# Patient Record
Sex: FEMALE | Race: WHITE | NOT HISPANIC OR LATINO | Employment: FULL TIME | URBAN - METROPOLITAN AREA
[De-identification: names, ages, dates, MRNs, and addresses within clinical notes are randomized per-mention and may not be internally consistent; named-entity substitution may affect disease eponyms.]

---

## 2017-03-15 NOTE — PATIENT INSTRUCTIONS
Preventive Health Recommendations  Female Ages 50 - 64    Yearly exam: See your health care provider every year in order to  o Review health changes.   o Discuss preventive care.    o Review your medicines if your doctor has prescribed any.      Get a Pap test every three years (unless you have an abnormal result and your provider advises testing more often).    If you get Pap tests with HPV test, you only need to test every 5 years, unless you have an abnormal result.     You do not need a Pap test if your uterus was removed (hysterectomy) and you have not had cancer.    You should be tested each year for STDs (sexually transmitted diseases) if you're at risk.     Have a mammogram every 1 to 2 years.    Have a colonoscopy at age 50, or have a yearly FIT test (stool test). These exams screen for colon cancer.      Have a cholesterol test every 5 years, or more often if advised.    Have a diabetes test (fasting glucose) every three years. If you are at risk for diabetes, you should have this test more often.     If you are at risk for osteoporosis (brittle bone disease), think about having a bone density scan (DEXA).    Shots: Get a flu shot each year. Get a tetanus shot every 10 years.    Nutrition:     Eat at least 5 servings of fruits and vegetables each day.    Eat whole-grain bread, whole-wheat pasta and brown rice instead of white grains and rice.    Talk to your provider about Calcium and Vitamin D.     Lifestyle    Exercise at least 150 minutes a week (30 minutes a day, 5 days a week). This will help you control your weight and prevent disease.    Limit alcohol to one drink per day.    No smoking.     Wear sunscreen to prevent skin cancer.     See your dentist every six months for an exam and cleaning.    See your eye doctor every 1 to 2 years.    [facility]    If you have any questions regarding to your visit please contact your care team:       Team Purple:   Clinic Hours Telephone Number    [address] Dr[address]   7am-7pm  Monday - Thursday   7am-5pm  Fridays  [phone]  (Appointment scheduling available 24/7)    Questions about your Visit?   Team Line:  [phone]   [facility] [place] [facility] 11[facility]-9pm Monday-Friday Saturday-Sunday- 9am-5pm   [place] - 5pm-9pm Monday-Friday Saturday-Sunday- 9am-5pm  [phone] - [place]   [phone] - [place]       What options do I have for visits at the clinic other than the traditional office visit?  To expand how we care for you, many of our providers are utilizing electronic visits (e-visits) and telephone visits, when medically appropriate, for interactions with their patients rather than a visit in the clinic.   We also offer nurse visits for many medical concerns. Just like any other service, we will bill your insurance company for this type of visit based on time spent on the phone with your provider. Not all insurance companies cover these visits. Please check with your medical insurance if this type of visit is covered. You will be responsible for any charges that are not paid by your insurance.      E-visits via Coquelux:  generally incur a $35.00 fee.  Telephone visits:  Time spent on the phone: *charged based on time that is spent on the phone in increments of 10 minutes. Estimated cost:   5-10 mins $30.00   11-20 mins. $59.00   21-30 mins. $85.00     Use Coquelux (secure email communication and access to your chart) to send your primary care provider a message or make an appointment. Ask someone on your Team how to sign up for Coquelux.  For a Price Quote for your services, please call our Consumer Price Line at [phone].  As always, Thank you for trusting us with your health care needs!

## 2017-03-15 NOTE — PROGRESS NOTES
SUBJECTIVE:                                                    Livia Gill is a 61 year old female who presents to clinic today for the following health issues:        Diabetes Follow-up    Patient is checking blood sugars: once daily.  Results are as follows:         am - 140-145    Diabetic concerns: None     Symptoms of hypoglycemia (low blood sugar): none     Paresthesias (numbness or burning in feet) or sores: No     Date of last diabetic eye exam: 9 months     Hyperlipidemia Follow-Up      Rate your low fat/cholesterol diet?: not monitoring fat    Taking statin?  Yes, no muscle aches from statin    Other lipid medications/supplements?:  none     Hypertension Follow-up      Outpatient blood pressures are not being checked.    Low Salt Diet: not monitoring salt         Amount of exercise or physical activity: None    Problems taking medications regularly: No    Medication side effects: none    Diet: regular (no restrictions)           Problem list and histories reviewed & adjusted, as indicated.  Additional history: as documented    Patient Active Problem List   Diagnosis     Hyperlipidemia LDL goal <160     Cervical cancer screening     Type 2 diabetes mellitus without complication, without long-term current use of insulin (H)     Past Surgical History:   Procedure Laterality Date     C NONSPECIFIC PROCEDURE  1989    laparscopy for infertility     CATARACT IOL, RT/LT Right 1/2015    right cataract      COLONOSCOPY  00, 06, 12    polyps, every 5 years     D & C  1989     HC FEMUR/KNEE SURG UNLISTED      Lt. knee dislocation     TONSILLECTOMY & ADENOIDECTOMY      age 14       Social History   Substance Use Topics     Smoking status: Never Smoker     Smokeless tobacco: Never Used     Alcohol use No     Family History   Problem Relation Age of Onset     DIABETES Father      Other Cancer Sister      Ovarian Cancer         Current Outpatient Prescriptions   Medication Sig Dispense Refill     order for DME  Equipment being ordered: bilateral wrist splints with thumbs 2 each 0     lisinopril (PRINIVIL/ZESTRIL) 10 MG tablet Take 1 tablet (10 mg) by mouth daily 90 tablet 3     aspirin 81 MG tablet Take 1 tablet (81 mg) by mouth daily 30 tablet      blood glucose monitoring (ACCU-CHEK SMARTVIEW) test strip Use to test blood sugar 2 times daily for 2 weeks and then decrease down to 1 time per day.  Ok to substitute alternative if insurance prefers. 50 each 3     blood glucose monitoring (ACCU-CHEK FASTCLIX) lancets Use to test blood sugar 1 times daily or as directed.  Ok to substitute alternative if insurance prefers. 1 Box 1     MAGNESIUM OXIDE PO Take 250 mg by mouth daily       metFORMIN (GLUCOPHAGE) 500 MG tablet Take 1 tablet (500 mg) by mouth 2 times daily (with meals) 180 tablet 4     rosuvastatin (CRESTOR) 40 MG tablet Take 1 tablet (40 mg) by mouth daily 90 tablet 4     IBUPROFEN PO        MULTIVITAMIN TABS   OR 1 TABLET DAILY       No Known Allergies  Recent Labs   Lab Test  03/22/17   1557  10/27/16   1457  10/19/16   0746  09/16/13   0958  07/25/12   0919  12/07/10   0940  06/29/09   1008   A1C  6.2*  7.0*   --    --    --    --   6.0   LDL   --    --   35  50  59  61  64   HDL   --    --   38*  39*  43*  33*  42*   TRIG   --    --   215*  219*  214*  253*  148   ALT   --    --    --    --   35  48  81*   CR   --    --    --    --   0.87  1.00   --    GFRESTIMATED   --    --    --    --   67  57*   --    GFRESTBLACK   --    --    --    --   81  69   --    POTASSIUM   --    --    --    --   4.3   --    --    TSH   --    --    --    --   2.66   --   3.09      BP Readings from Last 3 Encounters:   03/22/17 128/68   12/20/16 132/74   12/12/16 129/77    Wt Readings from Last 3 Encounters:   03/22/17 200 lb (90.7 kg)   12/20/16 204 lb (92.5 kg)   12/12/16 205 lb (93 kg)                  Labs reviewed in EPIC    Reviewed and updated as needed this visit by clinical staff  Tobacco  Allergies  Meds  Med Hx  Surg Hx  " Fam Hx  Soc Hx      Reviewed and updated as needed this visit by Provider         ROS:  This 61 year old female is here today for diabetes check. She was diagnosed 6 months ago and has taken it seriously. She has lost 13 pounds and is feeling healthier. She has developed pain and tingling into her hands with occasional numbness in her fingers while she sleeps. She wonders if that is related to diabetes. All other review of systems are negative  Personal, family, and social history reviewed with patient and revised.    C: NEGATIVE for fever, chills, change in weight  E/M: NEGATIVE for ear, mouth and throat problems  R: NEGATIVE for significant cough or SOB  CV: NEGATIVE for chest pain, palpitations or peripheral edema    OBJECTIVE:                                                    /68 (BP Location: Right arm, Patient Position: Chair, Cuff Size: Adult Large)  Pulse 93  Temp 96.8  F (36  C)  Ht 5' 4.5\" (1.638 m)  Wt 200 lb (90.7 kg)  SpO2 99%  BMI 33.8 kg/m2  Body mass index is 33.8 kg/(m^2).  GENERAL: healthy, alert and no distress  NECK: no adenopathy, no asymmetry, masses, or scars and thyroid normal to palpation  RESP: lungs clear to auscultation - no rales, rhonchi or wheezes  CV: regular rate and rhythm, normal S1 S2, no S3 or S4, no murmur, click or rub, no peripheral edema and peripheral pulses strong  ABDOMEN: truncal obesity   MS: no gross musculoskeletal defects noted, no edema  Diabetic foot exam: normal DP and PT pulses, no trophic changes or ulcerative lesions, normal sensory exam and normal monofilament exam  Patient has bilateral positive tinnel's sign   Well hydrated  Well nourished  Well groomed  Alert and oriented X 3  Good spirits  Brisk gait with no shortness of breath     Diagnostic Test Results:  Results for orders placed or performed in visit on 03/22/17   Hemoglobin A1c   Result Value Ref Range    Hemoglobin A1C 6.2 (H) 4.3 - 6.0 %         ASSESSMENT/PLAN:                        "                                      1. Type 2 diabetes mellitus without complication, without long-term current use of insulin (H)  Good control   - Albumin Random Urine Quantitative  - TSH WITH FREE T4 REFLEX  - Hemoglobin A1c  - Basic metabolic panel  - C FOOT EXAM  NO CHARGE    2. Bilateral carpal tunnel syndrome  As above   - order for DME; Equipment being ordered: bilateral wrist splints with thumbs  Dispense: 2 each; Refill: 0    Return to clinic 3 months     AZIZA SHIELDS MD  [facility]

## 2017-03-22 ENCOUNTER — OFFICE VISIT (OUTPATIENT)
Dept: FAMILY MEDICINE | Facility: CLINIC | Age: 62
End: 2017-03-22
Payer: COMMERCIAL

## 2017-03-22 VITALS
SYSTOLIC BLOOD PRESSURE: 128 MMHG | WEIGHT: 200 LBS | BODY MASS INDEX: 33.32 KG/M2 | TEMPERATURE: 96.8 F | DIASTOLIC BLOOD PRESSURE: 68 MMHG | OXYGEN SATURATION: 99 % | HEART RATE: 93 BPM | HEIGHT: 65 IN

## 2017-03-22 DIAGNOSIS — E11.9 TYPE 2 DIABETES MELLITUS WITHOUT COMPLICATION, WITHOUT LONG-TERM CURRENT USE OF INSULIN (H): Primary | ICD-10-CM

## 2017-03-22 DIAGNOSIS — G56.03 BILATERAL CARPAL TUNNEL SYNDROME: ICD-10-CM

## 2017-03-22 LAB — HBA1C MFR BLD: 6.2 % (ref 4.3–6)

## 2017-03-22 PROCEDURE — 99214 OFFICE O/P EST MOD 30 MIN: CPT | Performed by: FAMILY MEDICINE

## 2017-03-22 PROCEDURE — 82043 UR ALBUMIN QUANTITATIVE: CPT | Performed by: FAMILY MEDICINE

## 2017-03-22 PROCEDURE — 83036 HEMOGLOBIN GLYCOSYLATED A1C: CPT | Performed by: FAMILY MEDICINE

## 2017-03-22 PROCEDURE — 80048 BASIC METABOLIC PNL TOTAL CA: CPT | Performed by: FAMILY MEDICINE

## 2017-03-22 PROCEDURE — 84443 ASSAY THYROID STIM HORMONE: CPT | Performed by: FAMILY MEDICINE

## 2017-03-22 PROCEDURE — 36415 COLL VENOUS BLD VENIPUNCTURE: CPT | Performed by: FAMILY MEDICINE

## 2017-03-22 PROCEDURE — 99207 C FOOT EXAM  NO CHARGE: CPT | Performed by: FAMILY MEDICINE

## 2017-03-22 NOTE — MR AVS SNAPSHOT
After Visit Summary   3/22/2017    Livia Gill    MRN: [id number]           Patient Information     Date Of Birth          1955        Visit Information        Provider Department      3/22/2017 4:00 PM Livia Oliver MD [facility]        Today's Diagnoses     Type 2 diabetes mellitus without complication, without long-term current use of insulin (H)    -  1    Bilateral carpal tunnel syndrome          Care Instructions      Preventive Health Recommendations  Female Ages 50 - 64    Yearly exam: See your health care provider every year in order to  o Review health changes.   o Discuss preventive care.    o Review your medicines if your doctor has prescribed any.      Get a Pap test every three years (unless you have an abnormal result and your provider advises testing more often).    If you get Pap tests with HPV test, you only need to test every 5 years, unless you have an abnormal result.     You do not need a Pap test if your uterus was removed (hysterectomy) and you have not had cancer.    You should be tested each year for STDs (sexually transmitted diseases) if you're at risk.     Have a mammogram every 1 to 2 years.    Have a colonoscopy at age 50, or have a yearly FIT test (stool test). These exams screen for colon cancer.      Have a cholesterol test every 5 years, or more often if advised.    Have a diabetes test (fasting glucose) every three years. If you are at risk for diabetes, you should have this test more often.     If you are at risk for osteoporosis (brittle bone disease), think about having a bone density scan (DEXA).    Shots: Get a flu shot each year. Get a tetanus shot every 10 years.    Nutrition:     Eat at least 5 servings of fruits and vegetables each day.    Eat whole-grain bread, whole-wheat pasta and brown rice instead of white grains and rice.    Talk to your provider about Calcium and Vitamin D.     Lifestyle    Exercise at least 150 minutes a  week (30 minutes a day, 5 days a week). This will help you control your weight and prevent disease.    Limit alcohol to one drink per day.    No smoking.     Wear sunscreen to prevent skin cancer.     See your dentist every six months for an exam and cleaning.    See your eye doctor every 1 to 2 years.    [facility]    If you have any questions regarding to your visit please contact your care team:       Team Purple:   Clinic Hours Telephone Number   [address] Dr[address]   7am-7pm  Monday - Thursday   7am-5pm  Fridays  [phone]  (Appointment scheduling available 24/7)    Questions about your Visit?   Team Line:  [phone]   Urgent Care - [place] and [facility] - 11am-9pm Monday-Friday Saturday-Sunday- 9am-5pm   [place] - 5pm-9pm Monday-Friday Saturday-Sunday- 9am-5pm  [phone] - [facility]  [phone] - [place]       What options do I have for visits at the clinic other than the traditional office visit?  To expand how we care for you, many of our providers are utilizing electronic visits (e-visits) and telephone visits, when medically appropriate, for interactions with their patients rather than a visit in the clinic.   We also offer nurse visits for many medical concerns. Just like any other service, we will bill your insurance company for this type of visit based on time spent on the phone with your provider. Not all insurance companies cover these visits. Please check with your medical insurance if this type of visit is covered. You will be responsible for any charges that are not paid by your insurance.      E-visits via OrthoFi:  generally incur a $35.00 fee.  Telephone visits:  Time spent on the phone: *charged based on time that is spent on the phone in increments of 10 minutes. Estimated cost:   5-10 mins $30.00   11-20 mins. $59.00   21-30 mins. $85.00     Use OrthoFi (secure email communication and  "access to your chart) to send your primary care provider a message or make an appointment. Ask someone on your Team how to sign up for EasyQasa.  For a Price Quote for your services, please call our [employer] Line at [phone].  As always, Thank you for trusting us with your health care needs!              Follow-ups after your visit        Who to contact     If you have questions or need follow up information about today's clinic visit or your schedule please contact [facility] directly at [phone].  Normal or non-critical lab and imaging results will be communicated to you by [place], letter or phone within 4 business days after the clinic has received the results. If you do not hear from us within 7 days, please contact the clinic through [employer] or phone. If you have a critical or abnormal lab result, we will notify you by phone as soon as possible.  Submit refill requests through EasyQasa or call your pharmacy and they will forward the refill request to us. Please allow 3 business days for your refill to be completed.          Additional Information About Your Visit        EasyQasa Information     EasyQasa gives you secure access to your electronic health record. If you see a primary care provider, you can also send messages to your care team and make appointments. If you have questions, please call your primary care clinic.  If you do not have a primary care provider, please call [phone] and they will assist you.        Care EveryWhere ID     This is your Care EveryWhere ID. This could be used by other organizations to access your [place] medical records  [id number]        Your Vitals Were     Pulse Temperature Height Pulse Oximetry BMI (Body Mass Index)       93 96.8  F (36  C) 5' 4.5\" (1.638 m) 99% 33.8 kg/m2        Blood Pressure from Last 3 Encounters:   03/22/17 128/68   12/20/16 132/74   12/12/16 129/77    Weight from Last 3 Encounters:   03/22/17 200 lb (90.7 kg)   12/20/16 " 204 lb (92.5 kg)   12/12/16 205 lb (93 kg)              We Performed the Following     Albumin Random Urine Quantitative     Basic metabolic panel     C FOOT EXAM  NO CHARGE     Hemoglobin A1c     TSH WITH FREE T4 REFLEX          Today's Medication Changes          These changes are accurate as of: 3/22/17  4:32 PM.  If you have any questions, ask your nurse or doctor.               Start taking these medicines.        Dose/Directions    order for DME   Used for:  Bilateral carpal tunnel syndrome   Started by:  Livia Oliver MD        Equipment being ordered: bilateral wrist splints with thumbs   Quantity:  2 each   Refills:  0            Where to get your medicines      Some of these will need a paper prescription and others can be bought over the counter.  Ask your nurse if you have questions.     Bring a paper prescription for each of these medications     order for DME                Primary Care Provider Office Phone # Fax #    Livia Oliver [phone]       [address] [address]        Thank you!     Thank you for choosing [facility]  for your care. Our goal is always to provide you with excellent care. Hearing back from our patients is one way we can continue to improve our services. Please take a few minutes to complete the written survey that you may receive in the mail after your visit with us. Thank you!             Your Updated Medication List - Protect others around you: Learn how to safely use, store and throw away your medicines at www.disposemymeds.org.          This list is accurate as of: 3/22/17  4:32 PM.  Always use your most recent med list.                   Brand Name Dispense Instructions for use    aspirin 81 MG tablet     30 tablet    Take 1 tablet (81 mg) by mouth daily       blood glucose monitoring lancets     1 Box    Use to test blood sugar 1 times daily or as directed.  Ok to substitute  alternative if insurance prefers.       blood glucose monitoring test strip    ACCU-CHEK SMARTVIEW    50 each    Use to test blood sugar 2 times daily for 2 weeks and then decrease down to 1 time per day.  Ok to substitute alternative if insurance prefers.       IBUPROFEN PO          lisinopril 10 MG tablet    PRINIVIL/ZESTRIL    90 tablet    Take 1 tablet (10 mg) by mouth daily       MAGNESIUM OXIDE PO      Take 250 mg by mouth daily       metFORMIN 500 MG tablet    GLUCOPHAGE    180 tablet    Take 1 tablet (500 mg) by mouth 2 times daily (with meals)       MULTIVITAMIN TABS   OR      1 TABLET DAILY       order for DME     2 each    Equipment being ordered: bilateral wrist splints with thumbs       rosuvastatin 40 MG tablet    CRESTOR    90 tablet    Take 1 tablet (40 mg) by mouth daily

## 2017-03-22 NOTE — LETTER
[address]  [place] [address]    March 24, 2017    Livia Gill  [address] [place] [address]          Dear Livia,  All of your lab tests are looking good. See me again in 3 months.   Enclosed is a copy of your results.     Results for orders placed or performed in visit on 03/22/17   Albumin Random Urine Quantitative   Result Value Ref Range    Creatinine Urine 56 mg/dL    Albumin Urine mg/L <5 mg/L    Albumin Urine mg/g Cr Unable to calculate due to low value 0 - 25 mg/g Cr   TSH WITH FREE T4 REFLEX   Result Value Ref Range    TSH 2.02 0.40 - 4.00 mU/L   Hemoglobin A1c   Result Value Ref Range    Hemoglobin A1C 6.2 (H) 4.3 - 6.0 %   Basic metabolic panel   Result Value Ref Range    Sodium 139 133 - 144 mmol/L    Potassium 4.7 3.4 - 5.3 mmol/L    Chloride 105 94 - 109 mmol/L    Carbon Dioxide 28 20 - 32 mmol/L    Anion Gap 6 3 - 14 mmol/L    Glucose 84 70 - 99 mg/dL    Urea Nitrogen 15 7 - 30 mg/dL    Creatinine 0.85 0.52 - 1.04 mg/dL    GFR Estimate 68 >60 mL/min/1.7m2    GFR Estimate If Black 82 >60 mL/min/1.7m2    Calcium 9.3 8.5 - 10.1 mg/dL       If you have any questions or concerns, please call myself or my nurse at [phone].      Sincerely,        Livia Oliver MD/pricila

## 2017-03-22 NOTE — NURSING NOTE
"Chief Complaint   Patient presents with     Recheck Medication       Initial /68 (BP Location: Right arm, Patient Position: Chair, Cuff Size: Adult Large)  Pulse 93  Temp 96.8  F (36  C)  Ht 5' 4.5\" (1.638 m)  Wt 200 lb (90.7 kg)  SpO2 99%  BMI 33.8 kg/m2 Estimated body mass index is 33.8 kg/(m^2) as calculated from the following:    Height as of this encounter: 5' 4.5\" (1.638 m).    Weight as of this encounter: 200 lb (90.7 kg).  Medication Reconciliation: complete   Carly Trimble MA      "

## 2017-03-23 LAB
ANION GAP SERPL CALCULATED.3IONS-SCNC: 6 MMOL/L (ref 3–14)
BUN SERPL-MCNC: 15 MG/DL (ref 7–30)
CALCIUM SERPL-MCNC: 9.3 MG/DL (ref 8.5–10.1)
CHLORIDE SERPL-SCNC: 105 MMOL/L (ref 94–109)
CO2 SERPL-SCNC: 28 MMOL/L (ref 20–32)
CREAT SERPL-MCNC: 0.85 MG/DL (ref 0.52–1.04)
CREAT UR-MCNC: 56 MG/DL
GFR SERPL CREATININE-BSD FRML MDRD: 68 ML/MIN/1.7M2
GLUCOSE SERPL-MCNC: 84 MG/DL (ref 70–99)
MICROALBUMIN UR-MCNC: <5 MG/L
MICROALBUMIN/CREAT UR: NORMAL MG/G CR (ref 0–25)
POTASSIUM SERPL-SCNC: 4.7 MMOL/L (ref 3.4–5.3)
SODIUM SERPL-SCNC: 139 MMOL/L (ref 133–144)
TSH SERPL DL<=0.005 MIU/L-ACNC: 2.02 MU/L (ref 0.4–4)

## 2017-06-09 ENCOUNTER — NURSE TRIAGE (OUTPATIENT)
Dept: NURSING | Facility: CLINIC | Age: 62
End: 2017-06-09

## 2017-06-09 ENCOUNTER — TELEPHONE (OUTPATIENT)
Dept: FAMILY MEDICINE | Facility: CLINIC | Age: 62
End: 2017-06-09

## 2017-06-09 ENCOUNTER — TELEPHONE (OUTPATIENT)
Dept: NURSING | Facility: CLINIC | Age: 62
End: 2017-06-09

## 2017-06-09 NOTE — TELEPHONE ENCOUNTER
Reason for Call:  Other call back    Detailed comments:  Patient calling. She has a lump/growth in her groin area that was bleeding this morning. Please call to advise.     Phone Number Patient can be reached at: Home number on file [phone] (home)    Best Time:  Any     Can we leave a detailed message on this number? YES    Call taken on 6/9/2017 at 4:52 PM by Rohini Dee

## 2017-06-09 NOTE — TELEPHONE ENCOUNTER
Caller states that lesion in her groin ( assessed  and referred to derm  at [facility] 2016 but did not follow up as advised)  Has started to bleed today.  Advised that message will be left for provider to contact her as  Referral has  and wants to know what to do.     Reason for Disposition    [1] Skin growth or mole AND [2] bleeds or itches or is painful    Additional Information    Negative: [1] Looks infected AND [2] large red area (> 2 in. or 5 cm)    Negative: [1] Fever AND [2] bump is tender to touch    Negative: [1] Fever AND [2] bright red area or streak    Negative: [1] Looks infected (spreading redness, pus) AND [2] no fever    Negative: Looks like a boil, infected sore, or deep ulcer    Negative: Caller cannot describe it clearly    Protocols used: SKIN LESION - MOLES OR GROWTHS-ADULT-  Adele Carranza RN  [facility]

## 2017-06-09 NOTE — TELEPHONE ENCOUNTER
"Clinic Action Needed:Yes call   patient [phone]  Reason for Call: wants advice from PCP  Patient Recommendations/Teaching:Referred to clinic - within 72 hours  Teaching per [facility] guidelines.skin lesions  Routed to: PCP and staff      Caller states that lesion in her groin ( assessed  and referred to derm  at [facility] 2016 but did not follow up as advised)     \"She has a large patch of raised, clumped flat tissues pieces in her right groin area that make me very concerned it could be warts.  Could even be genital warts. She needs to see dermatologist. \"       Has started to bleed today.  Advised that message will be left for provider to contact her as referral has  and wants to know what to do.     Adele Carranza RN  [facility]    "

## 2017-06-13 NOTE — TELEPHONE ENCOUNTER
Patient notified of providers message as written.  Patient has visit scheduled   Debbie Oropeza RN

## 2017-06-13 NOTE — PROGRESS NOTES
SUBJECTIVE:                                                    Livia Gill is a 62 year old female who presents to clinic today for the following health issues:      Diabetes Follow-up      Patient is checking blood sugars: rarely.  Results range from 125 to 150    Diabetic concerns: None     Symptoms of hypoglycemia (low blood sugar): none     Paresthesias (numbness or burning in feet) or sores: No     Date of last diabetic eye exam: 1 year    Hyperlipidemia Follow-Up      Rate your low fat/cholesterol diet?: fair    Taking statin?  Yes, possible muscle aches from statin    Other lipid medications/supplements?:  none    Hypertension Follow-up      Outpatient blood pressures are not being checked.    Low Salt Diet: low salt      Amount of exercise or physical activity: None    Problems taking medications regularly: No    Medication side effects: none    Diet: regular (no restrictions)          Problem list and histories reviewed & adjusted, as indicated.  Additional history: as documented    Patient Active Problem List   Diagnosis     Hyperlipidemia LDL goal <160     Cervical cancer screening     Type 2 diabetes mellitus without complication, without long-term current use of insulin (H)     Past Surgical History:   Procedure Laterality Date     C NONSPECIFIC PROCEDURE  1989    laparscopy for infertility     CATARACT IOL, RT/LT Right 1/2015    right cataract      COLONOSCOPY  00, 06, 12    polyps, every 5 years     D & C  1989     HC FEMUR/KNEE SURG UNLISTED      Lt. knee dislocation     TONSILLECTOMY & ADENOIDECTOMY      age 14       Social History   Substance Use Topics     Smoking status: Never Smoker     Smokeless tobacco: Never Used     Alcohol use No     Family History   Problem Relation Age of Onset     DIABETES Father      Other Cancer Sister      Ovarian Cancer         Current Outpatient Prescriptions   Medication Sig Dispense Refill     order for DME Equipment being ordered: bilateral wrist splints  with thumbs 2 each 0     lisinopril (PRINIVIL/ZESTRIL) 10 MG tablet Take 1 tablet (10 mg) by mouth daily 90 tablet 3     aspirin 81 MG tablet Take 1 tablet (81 mg) by mouth daily 30 tablet      blood glucose monitoring (ACCU-CHEK SMARTVIEW) test strip Use to test blood sugar 2 times daily for 2 weeks and then decrease down to 1 time per day.  Ok to substitute alternative if insurance prefers. 50 each 3     blood glucose monitoring (ACCU-CHEK FASTCLIX) lancets Use to test blood sugar 1 times daily or as directed.  Ok to substitute alternative if insurance prefers. 1 Box 1     MAGNESIUM OXIDE PO Take 250 mg by mouth daily       metFORMIN (GLUCOPHAGE) 500 MG tablet Take 1 tablet (500 mg) by mouth 2 times daily (with meals) 180 tablet 4     rosuvastatin (CRESTOR) 40 MG tablet Take 1 tablet (40 mg) by mouth daily 90 tablet 4     IBUPROFEN PO        MULTIVITAMIN TABS   OR 1 TABLET DAILY       Recent Labs   Lab Test  06/26/17   1024  03/22/17   1557  10/27/16   1457  10/19/16   0746  09/16/13   0958  07/25/12   0919  12/07/10   0940   06/29/09   1008   A1C  6.1*  6.2*  7.0*   --    --    --    --    --   6.0   LDL   --    --    --   35  50  59  61   --   64   HDL   --    --    --   38*  39*  43*  33*   --   42*   TRIG   --    --    --   215*  219*  214*  253*   --   148   ALT   --    --    --    --    --   35  48   --   81*   CR   --   0.85   --    --    --   0.87  1.00   < >   --    GFRESTIMATED   --   68   --    --    --   67  57*   < >   --    GFRESTBLACK   --   82   --    --    --   81  69   < >   --    POTASSIUM   --   4.7   --    --    --   4.3   --    --    --    TSH   --   2.02   --    --    --   2.66   --    --   3.09    < > = values in this interval not displayed.      BP Readings from Last 3 Encounters:   06/26/17 126/64   03/22/17 128/68   12/20/16 132/74    Wt Readings from Last 3 Encounters:   06/26/17 202 lb 8 oz (91.9 kg)   03/22/17 200 lb (90.7 kg)   12/20/16 204 lb (92.5 kg)                  Labs reviewed  "in EPIC    Reviewed and updated as needed this visit by clinical staff       Reviewed and updated as needed this visit by Provider       Immunization History   Administered Date(s) Administered     Influenza Vaccine IM 3yrs+ 4 Valent IIV4 09/17/2014, 12/02/2015, 09/22/2016     Mantoux 06/30/1999     Pneumococcal 23 valent 12/20/2016     TD (ADULT, 7+) 07/25/2012     TDAP Vaccine (Adacel) 09/16/2013     Tetanus 05/17/2001     Zoster vaccine, live 01/15/2015      ROS:  This 62 year old female is here today for diabetes check. She is trying hard to eat better but still can't lose weight. She is active with her 3 grandchildren. She has some pain in her shoulders, right worse than left. Pain radiates down into her right upper arm. No problem when she tries to lift her 1 year old grandson, but has pain if she simply lifts her pillow a certain way and wants to move the pillow. It is a radicular pain. No known trauma. She also still has the clump of warts in her right inguinal area. She never did see a dermatologist about that. Now the warts seem to bleed easily. She is eager to get another referral.   C: NEGATIVE for fever, chills, change in weight  E/M: NEGATIVE for ear, mouth and throat problems  R: NEGATIVE for significant cough or SOB  CV: NEGATIVE for chest pain, palpitations or peripheral edema    OBJECTIVE:     /64 (BP Location: Right arm, Patient Position: Chair, Cuff Size: Adult Large)  Pulse 95  Temp 97  F (36.1  C)  Ht 5' 4.5\" (1.638 m)  Wt 202 lb 8 oz (91.9 kg)  SpO2 95%  BMI 34.22 kg/m2  Body mass index is 34.22 kg/(m^2).  GENERAL: healthy, alert and no distress  NECK: no adenopathy, no asymmetry, masses, or scars and thyroid normal to palpation  RESP: lungs clear to auscultation - no rales, rhonchi or wheezes  CV: regular rate and rhythm, normal S1 S2, no S3 or S4, no murmur, click or rub, no peripheral edema and peripheral pulses strong  ABDOMEN: soft, truncal obesity   MS: no gross " musculoskeletal defects noted, no edema  But she has some tenderness in her right biceps tendon insertion area. Pain with external rotation and when she reaches up and back.   Diabetic foot exam: normal DP and PT pulses, no trophic changes or ulcerative lesions, normal sensory exam and normal monofilament exam  Patient has very large clump of warts in her right inguinal area along her panty line crease. 50% of the warts are now raw and bleeding. This needs to be seen by dermatology.   Well hydrated  Well nourished  Well groomed  Alert and oriented X 3  Good spirits  Brisk gait with no shortness of breath     Diagnostic Test Results:  Results for orders placed or performed in visit on 06/26/17 (from the past 24 hour(s))   Hemoglobin A1c   Result Value Ref Range    Hemoglobin A1C 6.1 (H) 4.3 - 6.0 %       ASSESSMENT/PLAN:              1. Type 2 diabetes mellitus without complication, without long-term current use of insulin (H)  Good control   - Hemoglobin A1c  - C FOOT EXAM  NO CHARGE    2. Shoulder impingement, right  As above   - [facility], HAND, AND CHIROPRACTIC REFERRAL    3. Common wart  As above   - DERMATOLOGY REFERRAL    Return to clinic 6 months     AZIZA SHIELDS MD  [facility]

## 2017-06-13 NOTE — TELEPHONE ENCOUNTER
Call her. I need to see her first. If she has genital warts, a gynecologist would be a better referral. I can determine that.     AZIZA SHIELDS M.D.

## 2017-06-26 ENCOUNTER — OFFICE VISIT (OUTPATIENT)
Dept: FAMILY MEDICINE | Facility: CLINIC | Age: 62
End: 2017-06-26
Payer: COMMERCIAL

## 2017-06-26 VITALS
HEIGHT: 65 IN | BODY MASS INDEX: 33.74 KG/M2 | OXYGEN SATURATION: 95 % | DIASTOLIC BLOOD PRESSURE: 64 MMHG | HEART RATE: 95 BPM | TEMPERATURE: 97 F | SYSTOLIC BLOOD PRESSURE: 126 MMHG | WEIGHT: 202.5 LBS

## 2017-06-26 DIAGNOSIS — B07.8 COMMON WART: ICD-10-CM

## 2017-06-26 DIAGNOSIS — E11.9 TYPE 2 DIABETES MELLITUS WITHOUT COMPLICATION, WITHOUT LONG-TERM CURRENT USE OF INSULIN (H): Primary | ICD-10-CM

## 2017-06-26 DIAGNOSIS — M25.811 SHOULDER IMPINGEMENT, RIGHT: ICD-10-CM

## 2017-06-26 LAB — HBA1C MFR BLD: 6.1 % (ref 4.3–6)

## 2017-06-26 PROCEDURE — 83036 HEMOGLOBIN GLYCOSYLATED A1C: CPT | Performed by: FAMILY MEDICINE

## 2017-06-26 PROCEDURE — 99214 OFFICE O/P EST MOD 30 MIN: CPT | Performed by: FAMILY MEDICINE

## 2017-06-26 PROCEDURE — 99207 C FOOT EXAM  NO CHARGE: CPT | Performed by: FAMILY MEDICINE

## 2017-06-26 PROCEDURE — 36415 COLL VENOUS BLD VENIPUNCTURE: CPT | Performed by: FAMILY MEDICINE

## 2017-06-26 NOTE — PATIENT INSTRUCTIONS
[facility]    If you have any questions regarding to your visit please contact your care team:       Team Purple:   Clinic Hours Telephone Number   Dr. Livia Roth     7am-7pm  Monday - Thursday   7am-5pm  Fridays  [phone]  (Appointment scheduling available 24/7)    Questions about your Visit?   Team Line:  [phone]   Urgent Care - [place] and [facility] - 11am-9pm Monday-Friday Saturday-Sunday- 9am-5pm   [place] - 5pm-9pm Monday-Friday Saturday-Sunday- 9am-5pm  [phone] - [facility]  [phone] - [place]       What options do I have for visits at the clinic other than the traditional office visit?  To expand how we care for you, many of our providers are utilizing electronic visits (e-visits) and telephone visits, when medically appropriate, for interactions with their patients rather than a visit in the clinic.   We also offer nurse visits for many medical concerns. Just like any other service, we will bill your insurance company for this type of visit based on time spent on the phone with your provider. Not all insurance companies cover these visits. Please check with your medical insurance if this type of visit is covered. You will be responsible for any charges that are not paid by your insurance.      E-visits via [employer]:  generally incur a $35.00 fee.  Telephone visits:  Time spent on the phone: *charged based on time that is spent on the phone in increments of 10 minutes. Estimated cost:   5-10 mins $30.00   11-20 mins. $59.00   21-30 mins. $85.00     Use [employer] (secure email communication and access to your chart) to send your primary care provider a message or make an appointment. Ask someone on your Team how to sign up for [employer].  For a Price Quote for your services, please call our Consumer Price Line at [phone].  As always, Thank you for trusting us with your health care needs!

## 2017-06-26 NOTE — NURSING NOTE
"Chief Complaint   Patient presents with     Recheck Medication     Diabetes, lipid     Mass     groin       Initial /64 (BP Location: Right arm, Patient Position: Chair, Cuff Size: Adult Large)  Pulse 95  Temp 97  F (36.1  C)  Ht 5' 4.5\" (1.638 m)  Wt 202 lb 8 oz (91.9 kg)  SpO2 95%  BMI 34.22 kg/m2 Estimated body mass index is 34.22 kg/(m^2) as calculated from the following:    Height as of this encounter: 5' 4.5\" (1.638 m).    Weight as of this encounter: 202 lb 8 oz (91.9 kg).  Medication Reconciliation: complete   Carly Trimble MA      "

## 2017-06-26 NOTE — MR AVS SNAPSHOT
After Visit Summary   6/26/2017    Livia Gill    MRN: [id number]           Patient Information     Date Of Birth          1955        Visit Information        Provider Department      6/26/2017 10:30 AM Livia Oliver MD [facility]        Today's Diagnoses     Type 2 diabetes mellitus without complication, without long-term current use of insulin (H)    -  1    Shoulder impingement, right          Care Instructions    [facility]    If you have any questions regarding to your visit please contact your care team:       Team Purple:   Clinic Hours Telephone Number   Dr. Livia Roth     7am-7pm  Monday - Thursday   7am-5pm  Fridays  [phone]  (Appointment scheduling available 24/7)    Questions about your Visit?   Team Line:  [phone]   Urgent Care - [place] and [facility] - 11am-9pm Monday-Friday Saturday-Sunday- 9am-5pm   [place] - 5pm-9pm Monday-Friday Saturday-Sunday- 9am-5pm  [phone] - [facility]  [phone] - [place]       What options do I have for visits at the clinic other than the traditional office visit?  To expand how we care for you, many of our providers are utilizing electronic visits (e-visits) and telephone visits, when medically appropriate, for interactions with their patients rather than a visit in the clinic.   We also offer nurse visits for many medical concerns. Just like any other service, we will bill your insurance company for this type of visit based on time spent on the phone with your provider. Not all insurance companies cover these visits. Please check with your medical insurance if this type of visit is covered. You will be responsible for any charges that are not paid by your insurance.      E-visits via [employer]:  generally incur a $35.00 fee.  Telephone visits:  Time spent on the phone: *charged based on time that is spent on the phone in increments of  10 minutes. Estimated cost:   5-10 mins $30.00   11-20 mins. $59.00   21-30 mins. $85.00     Use Face to [employer] (secure email communication and access to your chart) to send your primary care provider a message or make an appointment. Ask someone on your Team how to sign up for [employer].  For a Price Quote for your services, please call our Consumer Price Line at [phone].  As always, Thank you for trusting us with your health care needs!              Follow-ups after your visit        Additional Services     [facility] PT, HAND, AND CHIROPRACTIC REFERRAL       === This order will print in the [facility] Scheduling  Office ===    Physical therapy, hand therapy and chiropractic care are available through:    [facility]  [facility]    Call one easy number to schedule at any of the above locations:  [phone].    Your provider has referred you to Physical Therapy at [facility] or [facility]    Indication/Reason for Referral: Shoulder Pain  Onset of Illness:  Several weeks   Therapy Orders:  Evaluate and Treat  Special Programs:  None  Special Request:  None    Additional Comments for the therapist or chiropractor:  No known trauma     Please be aware that coverage of these services is subject to the terms and limitations of your health insurance plan.  Call member services at your health plan with any benefit or coverage questions.      Please bring the following to your appointment:    >>   Your personal calendar for scheduling future appointments  >>   Comfortable clothing                  Who to contact     If you have questions or need follow up information about today's clinic visit or your schedule please contact [facility] directly at [phone].  Normal or non-critical lab and imaging results will be communicated to you by MyChart, letter or phone within 4 business days after the clinic has received the results. If you do not hear from us within 7 days,  "please contact the clinic through [employer] or phone. If you have a critical or abnormal lab result, we will notify you by phone as soon as possible.  Submit refill requests through [employer] or call your pharmacy and they will forward the refill request to us. Please allow 3 business days for your refill to be completed.          Additional Information About Your Visit        [employer] Information     [employer] gives you secure access to your electronic health record. If you see a primary care provider, you can also send messages to your care team and make appointments. If you have questions, please call your primary care clinic.  If you do not have a primary care provider, please call [phone] and they will assist you.        Care EveryWhere ID     This is your Care EveryWhere ID. This could be used by other organizations to access your [facility] medical records  [id number]        Your Vitals Were     Pulse Temperature Height Pulse Oximetry BMI (Body Mass Index)       95 97  F (36.1  C) 5' 4.5\" (1.638 m) 95% 34.22 kg/m2        Blood Pressure from Last 3 Encounters:   06/26/17 126/64   03/22/17 128/68   12/20/16 132/74    Weight from Last 3 Encounters:   06/26/17 202 lb 8 oz (91.9 kg)   03/22/17 200 lb (90.7 kg)   12/20/16 204 lb (92.5 kg)              We Performed the Following     C FOOT EXAM  NO CHARGE     Hemoglobin A1c     [facility], HAND, AND CHIR[facility]RACTIC REFERRAL        Primary Care Provider Office Phone # Fax #    Livia FIFI Oliver [phone]       [address] [address]        Equal Access to Services     JAY CARREON : Hadii daniel mata hadcolten Somahsa, waaxda luqadaha, qaybta kaalmajacobo calvo. So [facility] [phone].    ATENCIÓN: Si habla español, tiene a jaimes disposición servicios gratuitos de asistencia lingüística. Joss al [phone].    We comply with applicable federal civil rights laws and " [place] laws. We do not discriminate on the basis of race, color, national origin, age, disability sex, sexual orientation or gender identity.            Thank you!     Thank you for choosing [facility]  for your care. Our goal is always to provide you with excellent care. Hearing back from our patients is one way we can continue to improve our services. Please take a few minutes to complete the written survey that you may receive in the mail after your visit with us. Thank you!             Your Updated Medication List - Protect others around you: Learn how to safely use, store and throw away your medicines at www.disposemymeds.org.          This list is accurate as of: 6/26/17 11:07 AM.  Always use your most recent med list.                   Brand Name Dispense Instructions for use Diagnosis    aspirin 81 MG tablet     30 tablet    Take 1 tablet (81 mg) by mouth daily    Type 2 diabetes mellitus without complication, without long-term current use of insulin (H)       blood glucose monitoring lancets     1 Box    Use to test blood sugar 1 times daily or as directed.  Ok to substitute alternative if insurance prefers.    Diabetes mellitus (H)       blood glucose monitoring test strip    ACCU-CHEK SMARTVIEW    50 each    Use to test blood sugar 2 times daily for 2 weeks and then decrease down to 1 time per day.  Ok to substitute alternative if insurance prefers.    Diabetes mellitus (H)       IBUPROFEN PO           lisinopril 10 MG tablet    PRINIVIL/ZESTRIL    90 tablet    Take 1 tablet (10 mg) by mouth daily    Type 2 diabetes mellitus without complication, without long-term current use of insulin (H)       MAGNESIUM OXIDE PO      Take 250 mg by mouth daily        metFORMIN 500 MG tablet    GLUCOPHAGE    180 tablet    Take 1 tablet (500 mg) by mouth 2 times daily (with meals)    Type 2 diabetes mellitus without complication, without long-term current use of insulin (H)       MULTIVITAMIN TABS   OR       1 TABLET DAILY    Arthralgia of shoulder       order for DME     2 each    Equipment being ordered: bilateral wrist splints with thumbs    Bilateral carpal tunnel syndrome       rosuvastatin 40 MG tablet    CRESTOR    90 tablet    Take 1 tablet (40 mg) by mouth daily    Hyperlipidemia LDL goal <160

## 2017-07-07 ENCOUNTER — THERAPY VISIT (OUTPATIENT)
Dept: PHYSICAL THERAPY | Facility: CLINIC | Age: 62
End: 2017-07-07
Payer: COMMERCIAL

## 2017-07-07 DIAGNOSIS — G54.0 TOS (THORACIC OUTLET SYNDROME): ICD-10-CM

## 2017-07-07 DIAGNOSIS — M25.511 SHOULDER PAIN, BILATERAL: Primary | ICD-10-CM

## 2017-07-07 DIAGNOSIS — M25.512 SHOULDER PAIN, BILATERAL: Primary | ICD-10-CM

## 2017-07-07 PROCEDURE — 97110 THERAPEUTIC EXERCISES: CPT | Mod: GP | Performed by: PHYSICAL THERAPIST

## 2017-07-07 PROCEDURE — 97140 MANUAL THERAPY 1/> REGIONS: CPT | Mod: GP | Performed by: PHYSICAL THERAPIST

## 2017-07-07 NOTE — PROGRESS NOTES
Subjective:    Patient is a 62 year old female presenting with rehab left shoulder hpi. The history is provided by the patient.   Livia Gill is a 62 year old female with a bilateral shoulders condition.  Condition occurred with:  Lifting, while carrying an object and unknown cause.  Condition occurred: at home.  This is a chronic condition  Pt describes bilat UE pain to her wrists, forearms and hands.  She reports N/T in mornings.  She reports it has worsened lately but has been going on for over 6 months.  .    Patient reports pain:  Upper arm and in the joint.  Radiates to:  Upper arm, elbow, lower arm, wrist and hand.  Pain is described as shooting and is intermittent and reported as 5/10 and 4/10.  Associated symptoms:  Numbness, tingling and loss of motion/stiffness. Pain is worse during the night and worse in the A.M..  Symptoms are exacerbated by using arm at shoulder level, lifting and carrying and relieved by rest.          General health as reported by patient is good.  Pertinent medical history includes:  Osteoarthritis, diabetes and overweight.        Current occupation is Exec assist .  Patient is working in normal job without restrictions.  Primary job tasks include:  Prolonged sitting.        Red flags:  None as reported by the patient.                        Objective:    Standing Alignment:    Cervical/Thoracic:  Forward head  Shoulder/UE:  Rounded shoulders, protracted scapula R and protracted scapula L                                  Cervical/Thoracic Evaluation  Arom wnl cervical: negative spurlings, min losses all directions due to poor posture                        Cervical Stability/Joint Clearing:    Left positive at:1st Rib; 1st Rib Expansion and TOS Screen  Right positive at:  1st Rib; 1st Rib Expansion and TOS Screen           Shoulder Evaluation:  ROM:  AROM:  normal                            PROM:  normal  Flexion:  Left:  171    Right: 175          Internal Rotation:  Left:  26     Right:  F  External Rotation:  Left:  77    Right:  F                        Special Tests:    Left shoulder positive for the following special tests:  Impingement and Bursal  Left shoulder negative for the following special tests:  Rotator cuff tear  Right shoulder positive for the following special tests:Impingement and Bursal  Right shoulder negative for the following special tests:Rotator cuff tear  Palpation:    Left shoulder tenderness present at:  Subscapularis; Levator; Upper Trap and Bicipital Groove  Right shoulder tenderness present at: Levator; Upper Trap and Bicipital Groove  Right shoulder tenderness not present at:Subscapularis  Mobility Tests:  normal                                                 General     ROS    Assessment/Plan:      Patient is a 62 year old female with both sides shoulder, both sides elbow and both sides wrist/hand complaints.    Patient has the following significant findings with corresponding treatment plan.                Diagnosis 1:  Shoulder pain   Pain -  hot/cold therapy, US, manual therapy, self management and home program  Decreased ROM/flexibility - manual therapy, therapeutic exercise and home program  Decreased joint mobility - manual therapy, therapeutic exercise and home program  Inflammation - cold therapy and US    Impaired muscle performance - neuro re-education and home program  Decreased function - therapeutic activities  Impaired posture - neuro re-education  Diagnosis 2:  Thoracic outlet    Pain -  manual therapy, self management and home program  Decreased ROM/flexibility - manual therapy, therapeutic exercise and home program  Decreased joint mobility - manual therapy and therapeutic exercise  Impaired muscle performance - neuro re-education  Decreased function - therapeutic activities    Impaired posture - neuro re-education        Previous and current functional limitations:  (See Goal Flow Sheet for this information)    Short term and Long term  goals: (See Goal Flow Sheet for this information)     Communication ability:  Patient appears to be able to clearly communicate and understand verbal and written communication and follow directions correctly.  Treatment Explanation - The following has been discussed with the patient:   RX ordered/plan of care  Anticipated outcomes  Possible risks and side effects  This patient would benefit from PT intervention to resume normal activities.   Rehab potential is good.    Frequency:  1 X week, once daily  Duration:  for 6 weeks  Discharge Plan:  Achieve all LTG.  Independent in home treatment program.  Reach maximal therapeutic benefit.    Please refer to the daily flowsheet for treatment today, total treatment time and time spent performing 1:1 timed codes.

## 2017-07-07 NOTE — MR AVS SNAPSHOT
After Visit Summary   7/7/2017    Livia Gill    MRN: [id number]           Patient Information     Date Of Birth          1955        Visit Information        Provider Department      7/7/2017 4:00 PM Blaze Delacruz PT [facility]        Today's Diagnoses     Shoulder pain, bilateral    -  1    TOS (thoracic outlet syndrome)           Follow-ups after your visit        Your next 10 appointments already scheduled     Jul 14, 2017  4:00 PM CDT   MICHELLE Extremity with JOY Jackson PT (MICHELLE Joshi)    [address] [facility]  [address]  [facility] [id number] [phone]            Sep 12, 2017 10:30 AM CDT   New Visit with Manuel Andrade MD   [facility]    [address] [place] [address] [phone]              Who to contact     If you have questions or need follow up information about today's clinic visit or your schedule please contact [facility] directly at [phone].  Normal or non-critical lab and imaging results will be communicated to you by BUKAhart, letter or phone within 4 business days after the clinic has received the results. If you do not hear from us within 7 days, please contact the clinic through BUKAhart or phone. If you have a critical or abnormal lab result, we will notify you by phone as soon as possible.  Submit refill requests through Rover or call your pharmacy and they will forward the refill request to us. Please allow 3 business days for your refill to be completed.          Additional Information About Your Visit        MyChart Information     Rover gives you secure access to your electronic health record. If you see a primary care provider, you can also send messages to your care team and make appointments. If you have questions, please call your primary care clinic.  If you do not have a primary care provider, please call [phone] and they will assist  you.        Care EveryWhere ID     This is your Care EveryWhere ID. This could be used by other organizations to access your [facility] medical records  [id number]         Blood Pressure from Last 3 Encounters:   06/26/17 126/64   03/22/17 128/68   12/20/16 132/74    Weight from Last 3 Encounters:   06/26/17 91.9 kg (202 lb 8 oz)   03/22/17 90.7 kg (200 lb)   12/20/16 92.5 kg (204 lb)              We Performed the Following     MICHELLE INITIAL EVAL REPORT     MICHELLE PROGRESS NOTES REPORT     MANUAL THER TECH,1+REGIONS,EA 15 MIN     THERAPEUTIC EXERCISES        Primary Care Provider Office Phone # Fax #    Livia Oliver [phone]       [address] [address]        Equal Access to Services     JAY CARREON : Hadii aad ku hadasho Soomaali, waaxda luqadaha, qaybta kaalmada adeegyada, jacobo murdock hayaan dillon rolon . So [facility] [phone].    ATENCIÓN: Si habla español, tiene a jaimes disposición servicios gratuitos de asistencia lingüística. Llame al [phone].    We comply with applicable federal civil rights laws and [place] laws. We do not discriminate on the basis of race, color, national origin, age, disability sex, sexual orientation or gender identity.            Thank you!     Thank you for choosing [facility]   for your care. Our goal is always to provide you with excellent care. Hearing back from our patients is one way we can continue to improve our services. Please take a few minutes to complete the written survey that you may receive in the mail after your visit with us. Thank you!             Your Updated Medication List - Protect others around you: Learn how to safely use, store and throw away your medicines at www.disposemymeds.org.          This list is accurate as of: 7/7/17  4:59 PM.  Always use your most recent med list.                   Brand Name Dispense Instructions for use Diagnosis    aspirin 81 MG tablet     30  tablet    Take 1 tablet (81 mg) by mouth daily    Type 2 diabetes mellitus without complication, without long-term current use of insulin (H)       blood glucose monitoring lancets     1 Box    Use to test blood sugar 1 times daily or as directed.  Ok to substitute alternative if insurance prefers.    Diabetes mellitus (H)       blood glucose monitoring test strip    ACCU-CHEK SMARTVIEW    50 each    Use to test blood sugar 2 times daily for 2 weeks and then decrease down to 1 time per day.  Ok to substitute alternative if insurance prefers.    Diabetes mellitus (H)       IBUPROFEN PO           lisinopril 10 MG tablet    PRINIVIL/ZESTRIL    90 tablet    Take 1 tablet (10 mg) by mouth daily    Type 2 diabetes mellitus without complication, without long-term current use of insulin (H)       MAGNESIUM OXIDE PO      Take 250 mg by mouth daily        metFORMIN 500 MG tablet    GLUCOPHAGE    180 tablet    Take 1 tablet (500 mg) by mouth 2 times daily (with meals)    Type 2 diabetes mellitus without complication, without long-term current use of insulin (H)       MULTIVITAMIN TABS   OR      1 TABLET DAILY    Arthralgia of shoulder       order for DME     2 each    Equipment being ordered: bilateral wrist splints with thumbs    Bilateral carpal tunnel syndrome       rosuvastatin 40 MG tablet    CRESTOR    90 tablet    Take 1 tablet (40 mg) by mouth daily    Hyperlipidemia LDL goal <160

## 2017-07-17 ENCOUNTER — THERAPY VISIT (OUTPATIENT)
Dept: PHYSICAL THERAPY | Facility: CLINIC | Age: 62
End: 2017-07-17
Payer: COMMERCIAL

## 2017-07-17 DIAGNOSIS — M25.511 CHRONIC PAIN OF BOTH SHOULDERS: ICD-10-CM

## 2017-07-17 DIAGNOSIS — G89.29 CHRONIC PAIN OF BOTH SHOULDERS: ICD-10-CM

## 2017-07-17 DIAGNOSIS — M25.512 CHRONIC PAIN OF BOTH SHOULDERS: ICD-10-CM

## 2017-07-17 DIAGNOSIS — G54.0 TOS (THORACIC OUTLET SYNDROME): ICD-10-CM

## 2017-07-17 PROCEDURE — 97140 MANUAL THERAPY 1/> REGIONS: CPT | Mod: GP | Performed by: PHYSICAL THERAPIST

## 2017-07-17 PROCEDURE — 97110 THERAPEUTIC EXERCISES: CPT | Mod: GP | Performed by: PHYSICAL THERAPIST

## 2017-08-09 ENCOUNTER — OFFICE VISIT (OUTPATIENT)
Dept: DERMATOLOGY | Facility: CLINIC | Age: 62
End: 2017-08-09
Attending: FAMILY MEDICINE
Payer: COMMERCIAL

## 2017-08-09 DIAGNOSIS — D48.5 NEOPLASM OF UNCERTAIN BEHAVIOR OF SKIN: Primary | ICD-10-CM

## 2017-08-09 DIAGNOSIS — L82.1 SEBORRHEIC KERATOSIS: ICD-10-CM

## 2017-08-09 PROCEDURE — 99202 OFFICE O/P NEW SF 15 MIN: CPT | Mod: 25 | Performed by: DERMATOLOGY

## 2017-08-09 PROCEDURE — 88305 TISSUE EXAM BY PATHOLOGIST: CPT | Performed by: DERMATOLOGY

## 2017-08-09 PROCEDURE — 11100 HC BIOPSY SKIN/SUBQ/MUC MEM, SINGLE LESION: CPT | Performed by: DERMATOLOGY

## 2017-08-09 NOTE — MR AVS SNAPSHOT
After Visit Summary   8/9/2017    Livia Gill    MRN: [id number]           Patient Information     Date Of Birth          1955        Visit Information        Provider Department      8/9/2017 3:45 PM Nathaly Linares MD [facility]        Today's Diagnoses     Neoplasm of uncertain behavior of skin    -  1    Seborrheic keratosis          Care Instructions    Wound Care After a Biopsy    What is a skin biopsy?  A skin biopsy allows the doctor to examine a very small piece of tissue under the microscope to determine the diagnosis and the best treatment for the skin condition. A local anesthetic (numbing medicine)  is injected with a very small needle into the skin area to be tested. A small piece of skin is taken from the area. Sometimes a suture (stitch) is used.     What are the risks of a skin biopsy?  I will experience scar, bleeding, swelling, pain, crusting and redness. I may experience incomplete removal or recurrence. Risks of this procedure are excessive bleeding, bruising, infection, nerve damage, numbness, thick (hypertrophic or keloidal) scar and non-diagnostic biopsy.    How should I care for my wound for the first 24 hours?    Keep the wound dry and covered for 24 hours    If it bleeds, hold direct pressure on the area for 15 minutes. If bleeding does not stop then go to the emergency room    Avoid strenuous exercise the first 1-2 days or as your doctor instructs you    How should I care for the wound after 24 hours?    After 24 hours, remove the bandage    You may bathe or shower as normal    If you had a scalp biopsy, you can shampoo as usual and can use shower water to clean the biopsy site daily    Clean the wound twice a day with gentle soap and water    Do not scrub, be gentle    Apply white petroleum/Vaseline after cleaning the wound with a cotton swab or a clean finger, and keep the site covered with a Bandaid /bandage. Bandages are not necessary with a scalp  biopsy    If you are unable to cover the site with a Bandaid /bandage, re-apply ointment 2-3 times a day to keep the site moist. Moisture will help with healing    Avoid strenuous activity for first 1-2 days    Avoid lakes, rivers, pools, and oceans until the stitches are removed or the site is healed    How do I clean my wound?    Wash hands thoroughly with soap or use hand  before all wound care    Clean the wound with gentle soap and water    Apply white petroleum/Vaseline  to wound after it is clean    Replace the Bandaid /bandage to keep the wound covered for the first few days or as instructed by your doctor    If you had a scalp biopsy, warm shower water to the area on a daily basis should suffice    What should I use to clean my wound?     Cotton-tipped applicators (Qtips )    White petroleum jelly (Vaseline ). Use a clean new container and use Q-tips to apply.    Bandaids   as needed    Gentle soap     How should I care for my wound long term?    Do not get your wound dirty    Keep up with wound care for one week or until the area is healed.    A small scab will form and fall off by itself when the area is completely healed. The area will be red and will become pink in color as it heals. Sun protection is very important for how your scar will turn out. Sunscreen with an SPF 30 or greater is recommended once the area is healed.    You should have some soreness but it should be mild and slowly go away over several days. Talk to your doctor about using tylenol for pain,    When should I call my doctor?  If you have increased:     Pain or swelling    Pus or drainage (clear or slightly yellow drainage is ok)    Temperature over 100F    Spreading redness or warmth around wound    When will I hear about my results?  The biopsy results can take 2-3 weeks to come back. The clinic will call you with the results, send you a [employer] message, or have you schedule a follow-up clinic or phone time to discuss the  results. Contact our clinics if you do not hear from us in 3 weeks.     Who should I call with questions?    [facility]: [phone]     [facility]: [phone]    For urgent needs outside of business hours call the [facility] at [phone] and ask for the dermatology resident on call              Follow-ups after your visit        Your next 10 appointments already scheduled     Aug 09, 2017  3:45 PM CDT   New Visit with Nathaly Linares MD   [facility] ([facility]    [address] [address] [place] [address] [phone]              Who to contact     If you have questions or need follow up information about today's clinic visit or your schedule please contact [facility] directly at [phone].  Normal or non-critical lab and imaging results will be communicated to you by MyChart, letter or phone within 4 business days after the clinic has received the results. If you do not hear from us within 7 days, please contact the clinic through MyChart or phone. If you have a critical or abnormal lab result, we will notify you by phone as soon as possible.  Submit refill requests through MoviePass or call your pharmacy and they will forward the refill request to us. Please allow 3 business days for your refill to be completed.          Additional Information About Your Visit        MyChart Information     MoviePass gives you secure access to your electronic health record. If you see a primary care provider, you can also send messages to your care team and make appointments. If you have questions, please call your primary care clinic.  If you do not have a primary care provider, please call [phone] and they will assist you.      MoviePass is an electronic gateway that provides easy, online access to your medical records. With MoviePass, you can request a clinic appointment, read your test  results, renew a prescription or communicate with your care team.     To access your existing account, please contact your [facility] or call [phone] for assistance.        Care EveryWhere ID     This is your Care EveryWhere ID. This could be used by other organizations to access your [facility] medical records  [id number]         Blood Pressure from Last 3 Encounters:   06/26/17 126/64   03/22/17 128/68   12/20/16 132/74    Weight from Last 3 Encounters:   06/26/17 91.9 kg (202 lb 8 oz)   03/22/17 90.7 kg (200 lb)   12/20/16 92.5 kg (204 lb)              Today, you had the following     No orders found for display       Primary Care Provider Office Phone # Fax #    Livia Oliver [phone]       [address] [address]        Equal Access to Services     JAY CARREON : Hadii aad ku hadasho Soomaali, waaxda luqadaha, qaybta kaalmada adeegyada, waxay paulin hayirinan dillon rolon . So [facility] [phone].    ATENCIÓN: Si habla español, tiene a jaimes disposición servicios gratuitos de asistencia lingüística. Llame al [phone].    We comply with applicable federal civil rights laws and [place] laws. We do not discriminate on the basis of race, color, national origin, age, disability sex, sexual orientation or gender identity.            Thank you!     Thank you for choosing [facility]  for your care. Our goal is always to provide you with excellent care. Hearing back from our patients is one way we can continue to improve our services. Please take a few minutes to complete the written survey that you may receive in the mail after your visit with us. Thank you!             Your Updated Medication List - Protect others around you: Learn how to safely use, store and throw away your medicines at www.disposemymeds.org.          This list is accurate as of: 8/9/17  3:37 PM.  Always use your  most recent med list.                   Brand Name Dispense Instructions for use Diagnosis    aspirin 81 MG tablet     30 tablet    Take 1 tablet (81 mg) by mouth daily    Type 2 diabetes mellitus without complication, without long-term current use of insulin (H)       blood glucose monitoring lancets     1 Box    Use to test blood sugar 1 times daily or as directed.  Ok to substitute alternative if insurance prefers.    Diabetes mellitus (H)       blood glucose monitoring test strip    ACCU-CHEK SMARTVIEW    50 each    Use to test blood sugar 2 times daily for 2 weeks and then decrease down to 1 time per day.  Ok to substitute alternative if insurance prefers.    Diabetes mellitus (H)       IBUPROFEN PO           lisinopril 10 MG tablet    PRINIVIL/ZESTRIL    90 tablet    Take 1 tablet (10 mg) by mouth daily    Type 2 diabetes mellitus without complication, without long-term current use of insulin (H)       MAGNESIUM OXIDE PO      Take 250 mg by mouth daily        metFORMIN 500 MG tablet    GLUCOPHAGE    180 tablet    Take 1 tablet (500 mg) by mouth 2 times daily (with meals)    Type 2 diabetes mellitus without complication, without long-term current use of insulin (H)       MULTIVITAMIN TABS   OR      1 TABLET DAILY    Arthralgia of shoulder       order for DME     2 each    Equipment being ordered: bilateral wrist splints with thumbs    Bilateral carpal tunnel syndrome       rosuvastatin 40 MG tablet    CRESTOR    90 tablet    Take 1 tablet (40 mg) by mouth daily    Hyperlipidemia LDL goal <160

## 2017-08-09 NOTE — LETTER
8/9/2017      RE: Livia Gill  [address] [facility] [address]       [facility] Dermatology Note    Dermatology Problem List:  none    Encounter Date: Aug 9, 2017    CC:  Chief Complaint   Patient presents with     Derm Problem     warts groin area and under breasts     History of Present Illness:  Ms. Livia Gill is a 62 year old female who presents as a referral from Dr. Oliver for lesions in the groin and chest. Today, the patient reports lesions on the chest she would like evaluated. The lesions are not currently bothersome. Has a cluster of lesions on the right groin that has a history of bleeding. She is not sure if the bleeding is because she dried the area too much. The patient reports no other lesions of concern.    Past Medical History:   Patient Active Problem List   Diagnosis     Hyperlipidemia LDL goal <160     Cervical cancer screening     Type 2 diabetes mellitus without complication, without long-term current use of insulin (H)     Shoulder pain, bilateral     TOS (thoracic outlet syndrome)     Past Medical History:   Diagnosis Date     Hypercholesterolemia      Hyperglycemia      Metabolic syndrome      Obesity      Vitamin D deficiency      Past Surgical History:   Procedure Laterality Date     C NONSPECIFIC PROCEDURE  1989    laparscopy for infertility     CATARACT IOL, RT/LT Right 1/2015    right cataract      COLONOSCOPY  00, 06, 12    polyps, every 5 years     D & C  1989     HC FEMUR/KNEE SURG UNLISTED      Lt. knee dislocation     TONSILLECTOMY & ADENOIDECTOMY      age 14     Social History:  The patient works as an . The patient denies use of tanning beds. The patient does not drink alcohol, smoke or use tobacco.    Family History:  There is no family history of skin cancer.    Medications:  Current Outpatient Prescriptions   Medication Sig Dispense Refill     lisinopril (PRINIVIL/ZESTRIL) 10 MG tablet Take 1  tablet (10 mg) by mouth daily 90 tablet 3     aspirin 81 MG tablet Take 1 tablet (81 mg) by mouth daily 30 tablet      blood glucose monitoring (ACCU-CHEK SMARTVIEW) test strip Use to test blood sugar 2 times daily for 2 weeks and then decrease down to 1 time per day.  Ok to substitute alternative if insurance prefers. 50 each 3     blood glucose monitoring (ACCU-CHEK FASTCLIX) lancets Use to test blood sugar 1 times daily or as directed.  Ok to substitute alternative if insurance prefers. 1 Box 1     MAGNESIUM OXIDE PO Take 250 mg by mouth daily       metFORMIN (GLUCOPHAGE) 500 MG tablet Take 1 tablet (500 mg) by mouth 2 times daily (with meals) 180 tablet 4     rosuvastatin (CRESTOR) 40 MG tablet Take 1 tablet (40 mg) by mouth daily 90 tablet 4     IBUPROFEN PO        MULTIVITAMIN TABS   OR 1 TABLET DAILY       order for DME Equipment being ordered: bilateral wrist splints with thumbs (Patient not taking: Reported on 8/9/2017) 2 each 0     No Known Allergies    Review of Systems:  -Skin/Heme New Pt: The patient denies frequent sun exposure. The patient denies excessive scarring or problems healing except as per HPI. The patient denies excessive bleeding.  -Skin: As above in HPI. No additional skin concerns.    Physical exam:  Vitals: There were no vitals taken for this visit.  GEN: This is a well developed, well-nourished female in no acute distress, in a pleasant mood.    SKIN: Focused examination of the chest and right groin was performed.  -There are waxy stuck on tan to brown papules on the chest.  -3.5cm plaque on the right groin fold.  -No other lesions of concern on areas examined.     Impression/Plan:  1. Seborrheic keratosis, chest    Discussed benign nature. Offered cryotherapy, patient declines at this time.     No further intervention required at this time.   2. Neoplasm of uncertain behavior    3.5cm plaque, right groin fold. Ddx SK versus condyloma acuminatum    Shave biopsy:  After discussion of  benefits and risks including but not limited to bleeding/bruising, pain/swelling, infection, scar, incomplete removal, nerve damage/numbness, recurrence, and non-diagnostic biopsy, written consent, verbal consent and photographs were obtained. Time-out was performed. The area was cleaned with isopropyl alcohol.  was injected to obtain adequate anesthesia of the lesion on the right groin fold. 0.5 ml of 1% lidocaine with 1:100,000 epinephrine was injected to obtain adequate anesthesia. A  shave biopsy was performed. Hemostasis was achieved with aluminium chloride. Vaseline and a sterile dressing were applied. The patient tolerated the procedure and no complications were noted. The patient was provided with verbal and written post care instructions.      CC Dr. Evangelista on close of this encounter.  Follow-up pending biopsy for possible cryotherapy, earlier for new or changing lesions.     Staff Involved:  Scribe/Staff    Scribe Disclosure:   IYaquelin, am serving as a scribe to document services personally performed by Dr. Nathaly Linares, based on data collection and the provider's statements to me.     I, Nathaly Linares MD, have reviewed the documentation recorded by the scribe and have edited it as needed. I have personally performed the services documented within the note, which accurately the services rendered and treatment plans formulated during the visit.    Nathaly Linares MD  Dermatologist, [facility]  [address],  [facility] [address]     Path results reviewed: verrucous keratosis. NFTN. Please notify patient of results.  SA Nathaly Linares MD

## 2017-08-09 NOTE — NURSING NOTE
Dermatology Rooming Note    Livia Gill's goals for this visit include:   Chief Complaint   Patient presents with     Derm Problem     warts groin area and under breasts       Is a scribe okay for this visit:YES    Are records needed for this visit(If yes, obtain release of information): No     Vitals: There were no vitals taken for this visit.    Referring Provider:  Livia Oliver MD  [address] [address]    Payal Briceno LPN

## 2017-08-09 NOTE — PATIENT INSTRUCTIONS

## 2017-08-09 NOTE — PROGRESS NOTES
[facility] Dermatology Note    Dermatology Problem List:  none    Encounter Date: Aug 9, 2017    CC:  Chief Complaint   Patient presents with     Derm Problem     warts groin area and under breasts     History of Present Illness:  Ms. Livia Gill is a 62 year old female who presents as a referral from Dr. Oliver for lesions in the groin and chest. Today, the patient reports lesions on the chest she would like evaluated. The lesions are not currently bothersome. Has a cluster of lesions on the right groin that has a history of bleeding. She is not sure if the bleeding is because she dried the area too much. The patient reports no other lesions of concern.    Past Medical History:   Patient Active Problem List   Diagnosis     Hyperlipidemia LDL goal <160     Cervical cancer screening     Type 2 diabetes mellitus without complication, without long-term current use of insulin (H)     Shoulder pain, bilateral     TOS (thoracic outlet syndrome)     Past Medical History:   Diagnosis Date     Hypercholesterolemia      Hyperglycemia      Metabolic syndrome      Obesity      Vitamin D deficiency      Past Surgical History:   Procedure Laterality Date     C NONSPECIFIC PROCEDURE  1989    laparscopy for infertility     CATARACT IOL, RT/LT Right 1/2015    right cataract      COLONOSCOPY  00, 06, 12    polyps, every 5 years     D & C  1989     HC FEMUR/KNEE SURG UNLISTED      Lt. knee dislocation     TONSILLECTOMY & ADENOIDECTOMY      age 14     Social History:  The patient works as an . The patient denies use of tanning beds. The patient does not drink alcohol, smoke or use tobacco.    Family History:  There is no family history of skin cancer.    Medications:  Current Outpatient Prescriptions   Medication Sig Dispense Refill     lisinopril (PRINIVIL/ZESTRIL) 10 MG tablet Take 1 tablet (10 mg) by mouth daily 90 tablet 3     aspirin 81 MG tablet Take 1 tablet (81 mg) by mouth daily 30  tablet      blood glucose monitoring (ACCU-CHEK SMARTVIEW) test strip Use to test blood sugar 2 times daily for 2 weeks and then decrease down to 1 time per day.  Ok to substitute alternative if insurance prefers. 50 each 3     blood glucose monitoring (ACCU-CHEK FASTCLIX) lancets Use to test blood sugar 1 times daily or as directed.  Ok to substitute alternative if insurance prefers. 1 Box 1     MAGNESIUM OXIDE PO Take 250 mg by mouth daily       metFORMIN (GLUCOPHAGE) 500 MG tablet Take 1 tablet (500 mg) by mouth 2 times daily (with meals) 180 tablet 4     rosuvastatin (CRESTOR) 40 MG tablet Take 1 tablet (40 mg) by mouth daily 90 tablet 4     IBUPROFEN PO        MULTIVITAMIN TABS   OR 1 TABLET DAILY       order for DME Equipment being ordered: bilateral wrist splints with thumbs (Patient not taking: Reported on 8/9/2017) 2 each 0     No Known Allergies    Review of Systems:  -Skin/Heme New Pt: The patient denies frequent sun exposure. The patient denies excessive scarring or problems healing except as per HPI. The patient denies excessive bleeding.  -Skin: As above in HPI. No additional skin concerns.    Physical exam:  Vitals: There were no vitals taken for this visit.  GEN: This is a well developed, well-nourished female in no acute distress, in a pleasant mood.    SKIN: Focused examination of the chest and right groin was performed.  -There are waxy stuck on tan to brown papules on the chest.  -3.5cm plaque on the right groin fold.  -No other lesions of concern on areas examined.     Impression/Plan:  1. Seborrheic keratosis, chest    Discussed benign nature. Offered cryotherapy, patient declines at this time.     No further intervention required at this time.   2. Neoplasm of uncertain behavior    3.5cm plaque, right groin fold. Ddx SK versus condyloma acuminatum    Shave biopsy:  After discussion of benefits and risks including but not limited to bleeding/bruising, pain/swelling, infection, scar, incomplete  removal, nerve damage/numbness, recurrence, and non-diagnostic biopsy, written consent, verbal consent and photographs were obtained. Time-out was performed. The area was cleaned with isopropyl alcohol.  was injected to obtain adequate anesthesia of the lesion on the right groin fold. 0.5 ml of 1% lidocaine with 1:100,000 epinephrine was injected to obtain adequate anesthesia. A  shave biopsy was performed. Hemostasis was achieved with aluminium chloride. Vaseline and a sterile dressing were applied. The patient tolerated the procedure and no complications were noted. The patient was provided with verbal and written post care instructions.      CC Dr. Evangelista on close of this encounter.  Follow-up pending biopsy for possible cryotherapy, earlier for new or changing lesions.     Staff Involved:  Scribe/Staff    Scribe Disclosure:   I, Yaquelin Suarez, am serving as a scribe to document services personally performed by Dr. Nathaly Linares, based on data collection and the provider's statements to me.     I, Nathaly Linares MD, have reviewed the documentation recorded by the scribe and have edited it as needed. I have personally performed the services documented within the note, which accurately the services rendered and treatment plans formulated during the visit.    Nahtaly Linares MD  Dermatologist, [facility]  [address],  [facility] [address]

## 2017-08-14 LAB — COPATH REPORT: NORMAL

## 2017-10-11 NOTE — PROGRESS NOTES
SUBJECTIVE:   Livia Gill is a 62 year old female who presents to clinic today for the following health issues:    Diabetes Follow-up      Patient is checking blood sugars: rarely.  Results range from 140's to 170's    Diabetic concerns: None     Symptoms of hypoglycemia (low blood sugar): none     Paresthesias (numbness or burning in feet) or sores: No     Date of last diabetic eye exam: 6 months ago        Amount of exercise or physical activity: walking to and from car at work and around the office    Problems taking medications regularly: No    Medication side effects: none    Diet: regular (no restrictions)             Problem list and histories reviewed & adjusted, as indicated.  Additional history: as documented    Patient Active Problem List   Diagnosis     Hyperlipidemia LDL goal <160     Cervical cancer screening     Type 2 diabetes mellitus without complication, without long-term current use of insulin (H)     Shoulder pain, bilateral     TOS (thoracic outlet syndrome)     Class 1 obesity due to excess calories with serious comorbidity and body mass index (BMI) of 33.0 to 33.9 in adult     Past Surgical History:   Procedure Laterality Date     C NONSPECIFIC PROCEDURE  1989    laparscopy for infertility     CATARACT IOL, RT/LT Right 1/2015    right cataract      COLONOSCOPY  00, 06, 12    polyps, every 5 years     D & C  1989     HC FEMUR/KNEE SURG UNLISTED      Lt. knee dislocation     TONSILLECTOMY & ADENOIDECTOMY      age 14       Social History   Substance Use Topics     Smoking status: Never Smoker     Smokeless tobacco: Never Used     Alcohol use No     Family History   Problem Relation Age of Onset     DIABETES Father      Other Cancer Sister      Ovarian Cancer         Current Outpatient Prescriptions   Medication Sig Dispense Refill     rosuvastatin (CRESTOR) 40 MG tablet Take 1 tablet (40 mg) by mouth daily 90 tablet 4     order for DME Equipment being ordered: bilateral wrist splints with  thumbs 2 each 0     lisinopril (PRINIVIL/ZESTRIL) 10 MG tablet Take 1 tablet (10 mg) by mouth daily 90 tablet 3     aspirin 81 MG tablet Take 1 tablet (81 mg) by mouth daily 30 tablet      blood glucose monitoring (ACCU-CHEK SMARTVIEW) test strip Use to test blood sugar 2 times daily for 2 weeks and then decrease down to 1 time per day.  Ok to substitute alternative if insurance prefers. 50 each 3     blood glucose monitoring (ACCU-CHEK FASTCLIX) lancets Use to test blood sugar 1 times daily or as directed.  Ok to substitute alternative if insurance prefers. 1 Box 1     MAGNESIUM OXIDE PO Take 250 mg by mouth daily       metFORMIN (GLUCOPHAGE) 500 MG tablet Take 1 tablet (500 mg) by mouth 2 times daily (with meals) 180 tablet 4     IBUPROFEN PO        MULTIVITAMIN TABS   OR 1 TABLET DAILY       [DISCONTINUED] rosuvastatin (CRESTOR) 40 MG tablet Take 1 tablet (40 mg) by mouth daily 90 tablet 4     Recent Labs   Lab Test  06/26/17   1024  03/22/17   1557  10/27/16   1457  10/19/16   0746  09/16/13   0958  07/25/12   0919  12/07/10   0940   A1C  6.1*  6.2*  7.0*   --    --    --    --    LDL   --    --    --   35  50  59  61   HDL   --    --    --   38*  39*  43*  33*   TRIG   --    --    --   215*  219*  214*  253*   ALT   --    --    --    --    --   35  48   CR   --   0.85   --    --    --   0.87  1.00   GFRESTIMATED   --   68   --    --    --   67  57*   GFRESTBLACK   --   82   --    --    --   81  69   POTASSIUM   --   4.7   --    --    --   4.3   --    TSH   --   2.02   --    --    --   2.66   --       BP Readings from Last 3 Encounters:   10/20/17 112/62   06/26/17 126/64   03/22/17 128/68    Wt Readings from Last 3 Encounters:   10/20/17 198 lb 8 oz (90 kg)   06/26/17 202 lb 8 oz (91.9 kg)   03/22/17 200 lb (90.7 kg)                  Labs reviewed in EPIC          Reviewed and updated as needed this visit by clinical staffTobacco  Allergies  Meds       Reviewed and updated as needed this visit by  Provider        Immunization History   Administered Date(s) Administered     Influenza Vaccine IM 3yrs+ 4 Valent IIV4 09/17/2014, 12/02/2015, 09/22/2016, 10/20/2017     Mantoux 06/30/1999     Pneumococcal 23 valent 12/20/2016     TD (ADULT, 7+) 07/25/2012     TDAP Vaccine (Adacel) 09/16/2013     Tetanus 05/17/2001     Zoster vaccine, live 01/15/2015        ROS:  This 62 year old female is here today for diabetes check. She is pleased that she has lost a few pounds. 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 Is under a lot of stress. Both of her parents are in their 90's with health problems and live in [place], [place]. She is overdue for her colonoscopy due to past colon polyp. She has a head cold today. Has been coughing a lot all night. Has some wheezes with expiration. All other review of systems are negative  Personal, family, and social history reviewed with patient and revised.    C: NEGATIVE for fever, chills, change in weight  E/M: NEGATIVE for ear, mouth and throat problems  R: NEGATIVE for significant cough or SOB  CV: NEGATIVE for chest pain, palpitations or peripheral edema    OBJECTIVE:     /62  Pulse 106  Temp 98  F (36.7  C) (Oral)  Wt 198 lb 8 oz (90 kg)  SpO2 96%  BMI 33.55 kg/m2  Body mass index is 33.55 kg/(m^2).  GENERAL: healthy, alert and no distress  Ears: normal tympanic membranes and canals  Oral pharynx: normal   Nose: normal   NECK: no adenopathy, no asymmetry, masses, or scars and thyroid normal to palpation  RESP: lungs clear to auscultation - no rales, rhonchi or wheezes, has loose dry cough   CV: regular rate and rhythm, normal S1 S2, no S3 or S4, no murmur, click or rub, no peripheral edema and peripheral pulses strong  ABDOMEN: soft, but has truncal obesity   MS: no gross musculoskeletal defects noted, no edema  Diabetic foot exam: normal DP and PT pulses, no trophic changes or ulcerative lesions, normal sensory exam and normal monofilament exam  Well hydrated  Well  nourished  Well groomed  Alert and oriented X 3  Good spirits  Brisk gait with no shortness of breath     Diagnostic Test Results:  none     ASSESSMENT/PLAN:              1. Type 2 diabetes mellitus without complication, without long-term current use of insulin (H)  Good control   - Hemoglobin A1c  - C FOOT EXAM  NO CHARGE  - Basic metabolic panel; Future    2. Screen for colon cancer  Due   - GASTROENTEROLOGY ADULT REF PROCEDURE ONLY    3. Need for prophylactic vaccination and inoculation against influenza  due  - FLU VAC, SPLIT VIRUS IM > 3 YO (QUADRIVALENT) [44297]  - Vaccine Administration, Initial [91424]    4. Hyperlipidemia LDL goal <160  Good control   - rosuvastatin (CRESTOR) 40 MG tablet; Take 1 tablet (40 mg) by mouth daily  Dispense: 90 tablet; Refill: 4  - Lipid panel reflex to direct LDL; Future    5. Class 1 obesity due to excess calories with serious comorbidity and body mass index (BMI) of 33.0 to 33.9 in adult  Continue weight loss diet as it is slowly working       Return to clinic 3 months.     AZIZA SHIELDS MD  [facility]

## 2017-10-20 ENCOUNTER — OFFICE VISIT (OUTPATIENT)
Dept: FAMILY MEDICINE | Facility: CLINIC | Age: 62
End: 2017-10-20
Payer: COMMERCIAL

## 2017-10-20 VITALS
HEART RATE: 106 BPM | DIASTOLIC BLOOD PRESSURE: 62 MMHG | BODY MASS INDEX: 33.55 KG/M2 | TEMPERATURE: 98 F | WEIGHT: 198.5 LBS | OXYGEN SATURATION: 96 % | SYSTOLIC BLOOD PRESSURE: 112 MMHG

## 2017-10-20 DIAGNOSIS — E78.5 HYPERLIPIDEMIA LDL GOAL <160: ICD-10-CM

## 2017-10-20 DIAGNOSIS — E11.9 TYPE 2 DIABETES MELLITUS WITHOUT COMPLICATION, WITHOUT LONG-TERM CURRENT USE OF INSULIN (H): Primary | ICD-10-CM

## 2017-10-20 DIAGNOSIS — Z23 NEED FOR PROPHYLACTIC VACCINATION AND INOCULATION AGAINST INFLUENZA: ICD-10-CM

## 2017-10-20 DIAGNOSIS — E66.811 CLASS 1 OBESITY DUE TO EXCESS CALORIES WITH SERIOUS COMORBIDITY AND BODY MASS INDEX (BMI) OF 33.0 TO 33.9 IN ADULT: ICD-10-CM

## 2017-10-20 DIAGNOSIS — E66.09 CLASS 1 OBESITY DUE TO EXCESS CALORIES WITH SERIOUS COMORBIDITY AND BODY MASS INDEX (BMI) OF 33.0 TO 33.9 IN ADULT: ICD-10-CM

## 2017-10-20 DIAGNOSIS — Z12.11 SCREEN FOR COLON CANCER: ICD-10-CM

## 2017-10-20 LAB — HBA1C MFR BLD: 6.1 % (ref 4.3–6)

## 2017-10-20 PROCEDURE — 36415 COLL VENOUS BLD VENIPUNCTURE: CPT | Performed by: FAMILY MEDICINE

## 2017-10-20 PROCEDURE — 90471 IMMUNIZATION ADMIN: CPT | Performed by: FAMILY MEDICINE

## 2017-10-20 PROCEDURE — 90686 IIV4 VACC NO PRSV 0.5 ML IM: CPT | Performed by: FAMILY MEDICINE

## 2017-10-20 PROCEDURE — 83036 HEMOGLOBIN GLYCOSYLATED A1C: CPT | Performed by: FAMILY MEDICINE

## 2017-10-20 PROCEDURE — 99214 OFFICE O/P EST MOD 30 MIN: CPT | Mod: 25 | Performed by: FAMILY MEDICINE

## 2017-10-20 PROCEDURE — 99207 C FOOT EXAM  NO CHARGE: CPT | Performed by: FAMILY MEDICINE

## 2017-10-20 RX ORDER — ROSUVASTATIN CALCIUM 40 MG/1
40 TABLET, COATED ORAL DAILY
Qty: 90 TABLET | Refills: 4 | Status: SHIPPED | OUTPATIENT
Start: 2017-10-20 | End: 2018-07-31

## 2017-10-20 ASSESSMENT — PAIN SCALES - GENERAL: PAINLEVEL: NO PAIN (0)

## 2017-10-20 NOTE — PATIENT INSTRUCTIONS
[facility]    If you have any questions regarding to your visit please contact your care team:       Team Purple:   Clinic Hours Telephone Number   Dr. Livia Real   7am-7pm  Monday - Thursday   7am-5pm  Fridays  [phone]  (Appointment scheduling available 24/7)    Questions about your Visit?   Team Line:  [phone]   Urgent Care - [place] and [facility] - 11am-9pm Monday-Friday Saturday-Sunday- 9am-5pm   [place] - 5pm-9pm Monday-Friday Saturday-Sunday- 9am-5pm  [phone] - [facility]  [phone] - [place]       What options do I have for visits at the clinic other than the traditional office visit?  To expand how we care for you, many of our providers are utilizing electronic visits (e-visits) and telephone visits, when medically appropriate, for interactions with their patients rather than a visit in the clinic.   We also offer nurse visits for many medical concerns. Just like any other service, we will bill your insurance company for this type of visit based on time spent on the phone with your provider. Not all insurance companies cover these visits. Please check with your medical insurance if this type of visit is covered. You will be responsible for any charges that are not paid by your insurance.      E-visits via [employer]:  generally incur a $35.00 fee.  Telephone visits:  Time spent on the phone: *charged based on time that is spent on the phone in increments of 10 minutes. Estimated cost:   5-10 mins $30.00   11-20 mins. $59.00   21-30 mins. $85.00     Use [employer] (secure email communication and access to your chart) to send your primary care provider a message or make an appointment. Ask someone on your Team how to sign up for [employer].  For a Price Quote for your services, please call our Consumer Price Line at [phone].  As always, Thank you for trusting us with your health care needs!

## 2017-10-20 NOTE — MR AVS SNAPSHOT
After Visit Summary   10/20/2017    Livia Gill    MRN: [id number]           Patient Information     Date Of Birth          1955        Visit Information        Provider Department      10/20/2017 9:00 AM Livia Oliver MD [facility]        Today's Diagnoses     Type 2 diabetes mellitus without complication, without long-term current use of insulin (H)    -  1    Screen for colon cancer        Need for prophylactic vaccination and inoculation against influenza        Hyperlipidemia LDL goal <160          Care Instructions    [facility]    If you have any questions regarding to your visit please contact your care team:       Team Purple:   Clinic Hours Telephone Number   Dr. Livia Real   7am-7pm  Monday - Thursday   7am-5pm  Fridays  [phone]  (Appointment scheduling available 24/7)    Questions about your Visit?   Team Line:  [phone]   Urgent Care - [place] and [facility] - 11am-9pm Monday-Friday Saturday-Sunday- 9am-5pm   [place] - 5pm-9pm Monday-Friday Saturday-Sunday- 9am-5pm  [phone] - [place]   [phone] - [place]       What options do I have for visits at the clinic other than the traditional office visit?  To expand how we care for you, many of our providers are utilizing electronic visits (e-visits) and telephone visits, when medically appropriate, for interactions with their patients rather than a visit in the clinic.   We also offer nurse visits for many medical concerns. Just like any other service, we will bill your insurance company for this type of visit based on time spent on the phone with your provider. Not all insurance companies cover these visits. Please check with your medical insurance if this type of visit is covered. You will be responsible for any charges that are not paid by your insurance.      E-visits via [employer]:  generally incur  a $35.00 fee.  Telephone visits:  Time spent on the phone: *charged based on time that is spent on the phone in increments of 10 minutes. Estimated cost:   5-10 mins $30.00   11-20 mins. $59.00   21-30 mins. $85.00     Use [employer] (secure email communication and access to your chart) to send your primary care provider a message or make an appointment. Ask someone on your Team how to sign up for [facility].  For a Price Quote for your services, please call our [facility] Line at [phone].  As always, Thank you for trusting us with your health care needs!              Follow-ups after your visit        Additional Services     GASTROENTEROLOGY ADULT REF PROCEDURE ONLY       Last Lab Result: Creatinine (mg/dL)       Date                     Value                 03/22/2017               0.85             ----------  Body mass index is 33.55 kg/(m^2).      Patient will be contacted to schedule procedure.     Please be aware that coverage of these services is subject to the terms and limitations of your health insurance plan.  Call member services at your health plan with any benefit or coverage questions.  Any procedures must be performed at a [facility] facility OR coordinated by your clinic's referral office.    Please bring the following with you to your appointment:    (1) Any X-Rays, CTs or MRIs which have been performed.  Contact the facility where they were done to arrange for  prior to your scheduled appointment.    (2) List of current medications   (3) This referral request   (4) Any documents/labs given to you for this referral                  Future tests that were ordered for you today     Open Future Orders        Priority Expected Expires Ordered    Basic metabolic panel Routine  11/20/2017 10/20/2017    Lipid panel reflex to direct LDL Routine  11/20/2017 10/20/2017            Who to contact     If you have questions or need follow up information about today's clinic visit or your schedule please  contact [facility] directly at [phone].  Normal or non-critical lab and imaging results will be communicated to you by MyChart, letter or phone within 4 business days after the clinic has received the results. If you do not hear from us within 7 days, please contact the clinic through MyChart or phone. If you have a critical or abnormal lab result, we will notify you by phone as soon as possible.  Submit refill requests through [employer] or call your pharmacy and they will forward the refill request to us. Please allow 3 business days for your refill to be completed.          Additional Information About Your Visit        [employer] Information     [employer] gives you secure access to your electronic health record. If you see a primary care provider, you can also send messages to your care team and make appointments. If you have questions, please call your primary care clinic.  If you do not have a primary care provider, please call [phone] and they will assist you.        Care EveryWhere ID     This is your Care EveryWhere ID. This could be used by other organizations to access your [place] medical records  [id number]        Your Vitals Were     Pulse Temperature Pulse Oximetry BMI (Body Mass Index)          106 98  F (36.7  C) (Oral) 96% 33.55 kg/m2         Blood Pressure from Last 3 Encounters:   10/20/17 112/62   06/26/17 126/64   03/22/17 128/68    Weight from Last 3 Encounters:   10/20/17 198 lb 8 oz (90 kg)   06/26/17 202 lb 8 oz (91.9 kg)   03/22/17 200 lb (90.7 kg)              We Performed the Following     C FOOT EXAM  NO CHARGE     FLU VAC, SPLIT VIRUS IM > 3 YO (QUADRIVALENT) [62253]     GASTROENTEROLOGY ADULT REF PROCEDURE ONLY     Hemoglobin A1c     Vaccine Administration, Initial [62669]          Where to get your medicines      These medications were sent to [facility]/pharmacy #[id number] - [place], [place] - [address] [facility]  [address], [place]  [place] [address]     Phone:  [phone]     rosuvastatin 40 MG tablet          Primary Care Provider Office Phone # Fax #    Livia Oliver [phone]       [address] [address]        Equal Access to Services     JAY CARREON : Hadii aad ku hadasho Soomaali, waaxda luqadaha, qaybta kaalmada adeegyada, waxay idiin hayaan adechiqui emdley laToanaan . So [facility] [phone].    ATENCIÓN: Si habla español, tiene a jaimes disposición servicios gratuitos de asistencia lingüística. Llame al [phone].    We comply with applicable federal civil rights laws and [place] laws. We do not discriminate on the basis of race, color, national origin, age, disability, sex, sexual orientation, or gender identity.            Thank you!     Thank you for choosing [facility]  for your care. Our goal is always to provide you with excellent care. Hearing back from our patients is one way we can continue to improve our services. Please take a few minutes to complete the written survey that you may receive in the mail after your visit with us. Thank you!             Your Updated Medication List - Protect others around you: Learn how to safely use, store and throw away your medicines at www.disposemymeds.org.          This list is accurate as of: 10/20/17  9:24 AM.  Always use your most recent med list.                   Brand Name Dispense Instructions for use Diagnosis    aspirin 81 MG tablet     30 tablet    Take 1 tablet (81 mg) by mouth daily    Type 2 diabetes mellitus without complication, without long-term current use of insulin (H)       blood glucose monitoring lancets     1 Box    Use to test blood sugar 1 times daily or as directed.  Ok to substitute alternative if insurance prefers.    Diabetes mellitus (H)       blood glucose monitoring test strip    ACCU-CHEK SMARTVIEW    50 each    Use to test blood sugar 2 times daily for 2 weeks and then decrease  down to 1 time per day.  Ok to substitute alternative if insurance prefers.    Diabetes mellitus (H)       IBUPROFEN PO           lisinopril 10 MG tablet    PRINIVIL/ZESTRIL    90 tablet    Take 1 tablet (10 mg) by mouth daily    Type 2 diabetes mellitus without complication, without long-term current use of insulin (H)       MAGNESIUM OXIDE PO      Take 250 mg by mouth daily        metFORMIN 500 MG tablet    GLUCOPHAGE    180 tablet    Take 1 tablet (500 mg) by mouth 2 times daily (with meals)    Type 2 diabetes mellitus without complication, without long-term current use of insulin (H)       MULTIVITAMIN TABS   OR      1 TABLET DAILY    Arthralgia of shoulder       order for DME     2 each    Equipment being ordered: bilateral wrist splints with thumbs    Bilateral carpal tunnel syndrome       rosuvastatin 40 MG tablet    CRESTOR    90 tablet    Take 1 tablet (40 mg) by mouth daily    Hyperlipidemia LDL goal <160

## 2017-10-20 NOTE — NURSING NOTE
"Chief Complaint   Patient presents with     Diabetes     Flu Shot       Initial /62  Pulse 106  Temp 98  F (36.7  C) (Oral)  Wt 198 lb 8 oz (90 kg)  SpO2 96%  BMI 33.55 kg/m2 Estimated body mass index is 33.55 kg/(m^2) as calculated from the following:    Height as of 6/26/17: 5' 4.5\" (1.638 m).    Weight as of this encounter: 198 lb 8 oz (90 kg).  Medication Reconciliation: complete    "

## 2017-10-20 NOTE — LETTER
[address] [place] [address]    October 23, 2017    Livia Gill  [address] [place] [phone]          Dear Livia,    Your hemoglobin A1C is looking very good. Continue your healthy lifestyle. See me again in 6 months for diabetes check    Enclosed is a copy of your results.     Results for orders placed or performed in visit on 10/20/17   Hemoglobin A1c   Result Value Ref Range    Hemoglobin A1C 6.1 (H) 4.3 - 6.0 %       If you have any questions or concerns, please call myself or my nurse at [phone].      Sincerely,        Livia Oliver MD/SUZANNE

## 2017-10-20 NOTE — PROGRESS NOTES
Injectable Influenza Immunization Documentation    1.  Is the person to be vaccinated sick today?  NO Cold but no fevers    2. Does the person to be vaccinated have an allergy to a component   of the vaccine?   No  Egg Allergy Algorithm Link    3. Has the person to be vaccinated ever had a serious reaction   to influenza vaccine in the past?   No    4. Has the person to be vaccinated ever had Guillain-Barré syndrome?   No    Form completed by Koki Judd MA

## 2017-10-29 ENCOUNTER — HEALTH MAINTENANCE LETTER (OUTPATIENT)
Age: 62
End: 2017-10-29

## 2017-11-06 DIAGNOSIS — E11.9 DIABETES MELLITUS (H): ICD-10-CM

## 2017-11-06 NOTE — TELEPHONE ENCOUNTER
Prescription approved per [facility] Refill Protocol.    Signed Prescriptions:                        Disp   Refills    blood glucose monitoring (ACCU-CHEK SMARTV*50 each3        Sig: Use to test blood sugar 2 times daily for 2 weeks           then decrease down to 1 time per day. Ok to           substitute alternative if insurance prefers.  Authorizing Provider: AZIZA SHIELDS  Ordering User: ROCK SILVER RN

## 2017-11-13 DIAGNOSIS — E11.9 DIABETES MELLITUS (H): ICD-10-CM

## 2017-11-14 NOTE — TELEPHONE ENCOUNTER
Prescription approved per [facility] Refill Protocol.  Renata Monreal, RN - BC

## 2017-12-21 ENCOUNTER — TRANSFERRED RECORDS (OUTPATIENT)
Dept: HEALTH INFORMATION MANAGEMENT | Facility: CLINIC | Age: 62
End: 2017-12-21

## 2018-01-01 ENCOUNTER — DOCUMENTATION ONLY (OUTPATIENT)
Dept: LAB | Facility: CLINIC | Age: 63
End: 2018-01-01

## 2018-01-01 NOTE — PROGRESS NOTES
This patient has overdue labs. A letter was sent on 11/27/2017 and there has been no appointment made. If you still want these labs done, please have your care team contact the patient to make a lab appointment. Otherwise, please have the labs discontinued and close the encounter.    Thank you,  [facility]

## 2018-01-02 NOTE — PROGRESS NOTES
RN: please call patient and remind her that she must get her fasting labs done.     AZIZA SHIELDS M.D.

## 2018-01-02 NOTE — PROGRESS NOTES
Patient notified of providers message as written.  Patient scheduled for lab only appointment.   Debbie Oropeza RN

## 2018-01-15 DIAGNOSIS — E78.5 HYPERLIPIDEMIA LDL GOAL <160: ICD-10-CM

## 2018-01-15 DIAGNOSIS — E11.9 TYPE 2 DIABETES MELLITUS WITHOUT COMPLICATION, WITHOUT LONG-TERM CURRENT USE OF INSULIN (H): ICD-10-CM

## 2018-01-15 LAB
ANION GAP SERPL CALCULATED.3IONS-SCNC: 6 MMOL/L (ref 3–14)
BUN SERPL-MCNC: 19 MG/DL (ref 7–30)
CALCIUM SERPL-MCNC: 9.4 MG/DL (ref 8.5–10.1)
CHLORIDE SERPL-SCNC: 106 MMOL/L (ref 94–109)
CHOLEST SERPL-MCNC: 91 MG/DL
CO2 SERPL-SCNC: 30 MMOL/L (ref 20–32)
CREAT SERPL-MCNC: 0.99 MG/DL (ref 0.52–1.04)
GFR SERPL CREATININE-BSD FRML MDRD: 56 ML/MIN/1.7M2
GLUCOSE SERPL-MCNC: 136 MG/DL (ref 70–99)
HDLC SERPL-MCNC: 41 MG/DL
LDLC SERPL CALC-MCNC: 11 MG/DL
NONHDLC SERPL-MCNC: 50 MG/DL
POTASSIUM SERPL-SCNC: 3.8 MMOL/L (ref 3.4–5.3)
SODIUM SERPL-SCNC: 142 MMOL/L (ref 133–144)
TRIGL SERPL-MCNC: 197 MG/DL

## 2018-01-15 PROCEDURE — 80048 BASIC METABOLIC PNL TOTAL CA: CPT | Performed by: FAMILY MEDICINE

## 2018-01-15 PROCEDURE — 80061 LIPID PANEL: CPT | Performed by: FAMILY MEDICINE

## 2018-01-15 PROCEDURE — 36415 COLL VENOUS BLD VENIPUNCTURE: CPT | Performed by: FAMILY MEDICINE

## 2018-01-16 PROBLEM — M25.512 SHOULDER PAIN, BILATERAL: Status: RESOLVED | Noted: 2017-07-07 | Resolved: 2018-01-16

## 2018-01-16 PROBLEM — G54.0 TOS (THORACIC OUTLET SYNDROME): Status: RESOLVED | Noted: 2017-07-07 | Resolved: 2018-01-16

## 2018-01-16 PROBLEM — M25.511 SHOULDER PAIN, BILATERAL: Status: RESOLVED | Noted: 2017-07-07 | Resolved: 2018-01-16

## 2018-01-16 NOTE — PROGRESS NOTES
Subjective:  HPI                    Objective:  System    Physical Exam    General     ROS    Assessment/Plan:    DISCHARGE REPORT    Progress reporting period is from 7.10 to 7.17.17.     SUBJECTIVE          Initial Pain level: 5/10        ;   ,     The subjective and objective information are from the last SOAP note on this patient.    OBJECTIVE  Objective: +impinge bilat       ASSESSMENT/PLAN  Updated problem list and treatment plan: Diagnosis 1:  UE pain     STG/LTGs have been met or progress has been made towards goals:  Yes (See Goal flow sheet completed today.)  Assessment of Progress: The patient has met all of their long term goals.  Patient is meeting short term goals and is progressing towards long term goals.  Self Management Plans:  Patient has been instructed in a home treatment program.  Patient is independent in a home treatment program.  Patient is independent in self management of symptoms.    Livia continues to require the following intervention to meet STG and LTG's:   We will discharge this patient from PT.    Recommendations:  This patient is ready to be discharged from therapy and continue their home treatment program.    Please refer to the daily flowsheet for treatment today, total treatment time and time spent performing 1:1 timed codes.

## 2018-06-12 DIAGNOSIS — E11.9 TYPE 2 DIABETES MELLITUS WITHOUT COMPLICATION, WITHOUT LONG-TERM CURRENT USE OF INSULIN (H): ICD-10-CM

## 2018-06-12 RX ORDER — LISINOPRIL 10 MG/1
TABLET ORAL
Qty: 90 TABLET | Refills: 0 | Status: SHIPPED | OUTPATIENT
Start: 2018-06-12 | End: 2018-07-31

## 2018-06-12 NOTE — TELEPHONE ENCOUNTER
Signed Prescriptions:                        Disp   Refills    lisinopril (PRINIVIL/ZESTRIL) 10 MG tablet 90 tab*0        Sig: TAKE 1 TABLET (10 MG) BY MOUTH DAILY  Authorizing Provider: AZIZA SHIELDS  Ordering User: SHARON MARTINEZ    metFORMIN (GLUCOPHAGE) 500 MG tablet       60 tab*0        Sig: Take 1 tablet (500 mg) by mouth 2 times daily (with           meals) Office visit, labs needed for further           refills.  Authorizing Provider: AZIZA SHIELDS  Ordering User: SHARON MARTINEZ RN refilled medication per [facility] [place] Protocol. Metformin (GLUCOPHAGE) Medication is being filled for 1 time refill only due to:  Patient needs to be seen because she is due for diabetic f/u and labs. .      Sharon Martinez RN

## 2018-07-30 NOTE — PROGRESS NOTES
SUBJECTIVE:   Livia Gill is a 63 year old female who presents to clinic today for the following health issues:      Diabetes Follow-up      Patient is checking blood sugars: not at all    Diabetic concerns: None     Symptoms of hypoglycemia (low blood sugar): none     Paresthesias (numbness or burning in feet) or sores: No     Date of last diabetic eye exam: none    BP Readings from Last 2 Encounters:   07/31/18 100/60   10/20/17 112/62     Hemoglobin A1C (%)   Date Value   10/20/2017 6.1 (H)   06/26/2017 6.1 (H)     LDL Cholesterol Calculated (mg/dL)   Date Value   01/15/2018 11   10/19/2016 35       Diabetes Management Resources  Hyperlipidemia Follow-Up      Rate your low fat/cholesterol diet?: fair    Taking statin?  Yes, no muscle aches from statin    Other lipid medications/supplements?:  none      Amount of exercise or physical activity: None    Problems taking medications regularly: No    Medication side effects: none    Diet: regular (no restrictions)             Problem list and histories reviewed & adjusted, as indicated.  Additional history: as documented    Patient Active Problem List   Diagnosis     Hyperlipidemia LDL goal <160     Cervical cancer screening     Type 2 diabetes mellitus without complication, without long-term current use of insulin (H)     Class 1 obesity due to excess calories with serious comorbidity and body mass index (BMI) of 33.0 to 33.9 in adult     Past Surgical History:   Procedure Laterality Date     C NONSPECIFIC PROCEDURE  1989    laparscopy for infertility     CATARACT IOL, RT/LT Right 1/2015    right cataract      COLONOSCOPY  00, 06, 12, 17    polyps, every 5 years     D & C  1989     HC FEMUR/KNEE SURG UNLISTED      Lt. knee dislocation     TONSILLECTOMY & ADENOIDECTOMY      age 14       Social History   Substance Use Topics     Smoking status: Never Smoker     Smokeless tobacco: Never Used     Alcohol use No     Family History   Problem Relation Age of Onset      Diabetes Father      Other Cancer Sister 61     Ovarian Cancer         Current Outpatient Prescriptions   Medication Sig Dispense Refill     aspirin 81 MG tablet Take 1 tablet (81 mg) by mouth daily 30 tablet      blood glucose monitoring (ACCU-CHEK FASTCLIX) lancets Use to test blood sugar 1 times daily or as directed.  Ok to substitute alternative if insurance prefers. 1 Box 1     blood glucose monitoring (ACCU-CHEK SMARTVIEW) test strip Use to test blood sugar 2 times daily for 2 weeks then decrease down to 1 time per day. Ok to substitute alternative if insurance prefers. 100 each 1     IBUPROFEN PO        lisinopril (PRINIVIL/ZESTRIL) 10 MG tablet TAKE 1 TABLET (10 MG) BY MOUTH DAILY 90 tablet 4     metFORMIN (GLUCOPHAGE) 500 MG tablet Take 1 tablet (500 mg) by mouth 2 times daily (with meals) 180 tablet 4     MULTIVITAMIN TABS   OR 1 TABLET DAILY       rosuvastatin (CRESTOR) 40 MG tablet Take 1 tablet (40 mg) by mouth daily 90 tablet 4     [DISCONTINUED] lisinopril (PRINIVIL/ZESTRIL) 10 MG tablet TAKE 1 TABLET (10 MG) BY MOUTH DAILY 90 tablet 0     [DISCONTINUED] metFORMIN (GLUCOPHAGE) 500 MG tablet Take 1 tablet (500 mg) by mouth 2 times daily (with meals) Office visit, labs needed for further refills. 60 tablet 0     [DISCONTINUED] rosuvastatin (CRESTOR) 40 MG tablet Take 1 tablet (40 mg) by mouth daily 90 tablet 4     No Known Allergies  Recent Labs   Lab Test  07/31/18   1530  01/15/18   0908  10/20/17   0932  06/26/17   1024  03/22/17   1557   10/19/16   0746  09/16/13   0958  07/25/12   0919   12/07/10   0940   A1C  6.1*   --   6.1*  6.1*  6.2*   < >   --    --    --    --    --    LDL   --   11   --    --    --    --   35  50  59   --   61   HDL   --   41*   --    --    --    --   38*  39*  43*   --   33*   TRIG   --   197*   --    --    --    --   215*  219*  214*   --   253*   ALT   --    --    --    --    --    --    --    --   35   --   48   CR   --   0.99   --    --   0.85   --    --    --   0.87    "--   1.00   GFRESTIMATED   --   56*   --    --   68   --    --    --   67   --   57*   GFRESTBLACK   --   68   --    --   82   --    --    --   81   --   69   POTASSIUM   --   3.8   --    --   4.7   --    --    --   4.3   < >   --    TSH   --    --    --    --   2.02   --    --    --   2.66   --    --     < > = values in this interval not displayed.      BP Readings from Last 3 Encounters:   07/31/18 100/60   10/20/17 112/62   06/26/17 126/64    Wt Readings from Last 3 Encounters:   07/31/18 197 lb 6.4 oz (89.5 kg)   10/20/17 198 lb 8 oz (90 kg)   06/26/17 202 lb 8 oz (91.9 kg)                  Labs reviewed in EPIC    Reviewed and updated as needed this visit by clinical staff       Reviewed and updated as needed this visit by Provider       Immunization History   Administered Date(s) Administered     Influenza Vaccine IM 3yrs+ 4 Valent IIV4 09/17/2014, 12/02/2015, 09/22/2016, 10/20/2017     Mantoux Tuberculin Skin Test 06/30/1999     Pneumo Conj 13-V (2010&after) 07/31/2018     Pneumococcal 23 valent 12/20/2016     TD (ADULT, 7+) 07/25/2012     TDAP Vaccine (Adacel) 09/16/2013     Tetanus 05/17/2001     Zoster vaccine recombinant adjuvanted (SHINGRIX) 07/31/2018     Zoster vaccine, live 01/15/2015      ROS:  This 63 year old female is here today for diabetes check. She plans to work another 5 years as her youngest child needs health insurance coverage until age 26. Patient is trying harder to make better food choices. She is active with her grandchildren. All other review of systems are negative  Personal, family, and social history reviewed with patient and revised.    CONSTITUTIONAL: NEGATIVE for fever, chills, change in weight  ENT/MOUTH: NEGATIVE for ear, mouth and throat problems  RESP: NEGATIVE for significant cough or SOB  CV: NEGATIVE for chest pain, palpitations or peripheral edema    OBJECTIVE:     /60  Pulse 93  Temp 98.2  F (36.8  C) (Oral)  Resp 20  Ht 5' 4.13\" (1.629 m)  Wt 197 lb 6.4 oz " (89.5 kg)  SpO2 93%  BMI 33.74 kg/m2  Body mass index is 33.74 kg/(m^2).  GENERAL: healthy, alert and no distress  NECK: no adenopathy, no asymmetry, masses, or scars and thyroid normal to palpation  RESP: lungs clear to auscultation - no rales, rhonchi or wheezes  CV: regular rate and rhythm, normal S1 S2, no S3 or S4, no murmur, click or rub, no peripheral edema and peripheral pulses strong  ABDOMEN: soft, large truncal obesity   MS: no gross musculoskeletal defects noted, no edema  Well hydrated  Well nourished  Well groomed  Alert and oriented X 3  Good spirits  Brisk gait with no shortness of breath     Diagnostic Test Results:  Results for orders placed or performed in visit on 07/31/18 (from the past 24 hour(s))   HEMOGLOBIN A1C   Result Value Ref Range    Hemoglobin A1C 6.1 (H) 0 - 5.6 %       ASSESSMENT/PLAN:              1. Type 2 diabetes mellitus without complication, without long-term current use of insulin (H)  Good control   - HEMOGLOBIN A1C  - Albumin Random Urine Quantitative with Creat Ratio  - lisinopril (PRINIVIL/ZESTRIL) 10 MG tablet; TAKE 1 TABLET (10 MG) BY MOUTH DAILY  Dispense: 90 tablet; Refill: 4  - metFORMIN (GLUCOPHAGE) 500 MG tablet; Take 1 tablet (500 mg) by mouth 2 times daily (with meals)  Dispense: 180 tablet; Refill: 4  - Basic metabolic panel    2. Hyperlipidemia LDL goal <160  Good control   - rosuvastatin (CRESTOR) 40 MG tablet; Take 1 tablet (40 mg) by mouth daily  Dispense: 90 tablet; Refill: 4    3. Need for prophylactic vaccination against Streptococcus pneumoniae (pneumococcus)  due  - Pneumococcal vaccine 13 valent PCV13 IM (Prevnar) [21985]    4. Visit for screening mammogram  due  - MA SCREENING DIGITAL BILAT - Future  (s+30); Future    5. Screening for diabetic retinopathy  due  - OPHTHALMOLOGY ADULT REFERRAL    6. Encounter for screening for HIV  due  - HIV Screening    7. Need for shingles vaccine  due  - ZOSTER VACCINE RECOMBINANT ADJUVANTED IM NJX    Return to  clinic 6 months     AZIZA SHIELDS MD  [facility]

## 2018-07-31 ENCOUNTER — OFFICE VISIT (OUTPATIENT)
Dept: FAMILY MEDICINE | Facility: CLINIC | Age: 63
End: 2018-07-31
Payer: COMMERCIAL

## 2018-07-31 VITALS
SYSTOLIC BLOOD PRESSURE: 100 MMHG | HEIGHT: 64 IN | BODY MASS INDEX: 33.7 KG/M2 | TEMPERATURE: 98.2 F | WEIGHT: 197.4 LBS | OXYGEN SATURATION: 93 % | DIASTOLIC BLOOD PRESSURE: 60 MMHG | HEART RATE: 93 BPM | RESPIRATION RATE: 20 BRPM

## 2018-07-31 DIAGNOSIS — Z12.31 VISIT FOR SCREENING MAMMOGRAM: ICD-10-CM

## 2018-07-31 DIAGNOSIS — Z11.4 ENCOUNTER FOR SCREENING FOR HIV: ICD-10-CM

## 2018-07-31 DIAGNOSIS — E78.5 HYPERLIPIDEMIA LDL GOAL <160: ICD-10-CM

## 2018-07-31 DIAGNOSIS — E11.9 TYPE 2 DIABETES MELLITUS WITHOUT COMPLICATION, WITHOUT LONG-TERM CURRENT USE OF INSULIN (H): Primary | ICD-10-CM

## 2018-07-31 DIAGNOSIS — Z23 NEED FOR PROPHYLACTIC VACCINATION AGAINST STREPTOCOCCUS PNEUMONIAE (PNEUMOCOCCUS): ICD-10-CM

## 2018-07-31 DIAGNOSIS — Z13.5 SCREENING FOR DIABETIC RETINOPATHY: ICD-10-CM

## 2018-07-31 DIAGNOSIS — Z23 NEED FOR SHINGLES VACCINE: ICD-10-CM

## 2018-07-31 LAB
CREAT UR-MCNC: 173 MG/DL
HBA1C MFR BLD: 6.1 % (ref 0–5.6)
MICROALBUMIN UR-MCNC: 14 MG/L
MICROALBUMIN/CREAT UR: 7.98 MG/G CR (ref 0–25)

## 2018-07-31 PROCEDURE — 90472 IMMUNIZATION ADMIN EACH ADD: CPT | Performed by: FAMILY MEDICINE

## 2018-07-31 PROCEDURE — 90750 HZV VACC RECOMBINANT IM: CPT | Performed by: FAMILY MEDICINE

## 2018-07-31 PROCEDURE — 83036 HEMOGLOBIN GLYCOSYLATED A1C: CPT | Performed by: FAMILY MEDICINE

## 2018-07-31 PROCEDURE — 99214 OFFICE O/P EST MOD 30 MIN: CPT | Mod: 25 | Performed by: FAMILY MEDICINE

## 2018-07-31 PROCEDURE — 80048 BASIC METABOLIC PNL TOTAL CA: CPT | Performed by: FAMILY MEDICINE

## 2018-07-31 PROCEDURE — 87389 HIV-1 AG W/HIV-1&-2 AB AG IA: CPT | Performed by: FAMILY MEDICINE

## 2018-07-31 PROCEDURE — 82043 UR ALBUMIN QUANTITATIVE: CPT | Performed by: FAMILY MEDICINE

## 2018-07-31 PROCEDURE — 90670 PCV13 VACCINE IM: CPT | Performed by: FAMILY MEDICINE

## 2018-07-31 PROCEDURE — 90471 IMMUNIZATION ADMIN: CPT | Performed by: FAMILY MEDICINE

## 2018-07-31 PROCEDURE — 36415 COLL VENOUS BLD VENIPUNCTURE: CPT | Performed by: FAMILY MEDICINE

## 2018-07-31 RX ORDER — ROSUVASTATIN CALCIUM 40 MG/1
40 TABLET, COATED ORAL DAILY
Qty: 90 TABLET | Refills: 4 | Status: SHIPPED | OUTPATIENT
Start: 2018-07-31 | End: 2019-06-14

## 2018-07-31 RX ORDER — LISINOPRIL 10 MG/1
TABLET ORAL
Qty: 90 TABLET | Refills: 4 | Status: SHIPPED | OUTPATIENT
Start: 2018-07-31 | End: 2019-06-14

## 2018-07-31 NOTE — NURSING NOTE
Prior to injection verified patient identity using patient's name and date of birth.  Due to injection administration, patient instructed to remain in clinic for 15 minutes  afterwards, and to report any adverse reaction to me immediately.    Screening Questionnaire for Adult Immunization    Are you sick today?   No   Do you have allergies to medications, food, a vaccine component or latex?   No   Have you ever had a serious reaction after receiving a vaccination?   No   Do you have a long-term health problem with heart disease, lung disease, asthma, kidney disease, metabolic disease (e.g. diabetes), anemia, or other blood disorder?   No   Do you have cancer, leukemia, HIV/AIDS, or any other immune system problem?   No   In the past 3 months, have you taken medications that affect  your immune system, such as prednisone, other steroids, or anticancer drugs; drugs for the treatment of rheumatoid arthritis, Crohn s disease, or psoriasis; or have you had radiation treatments?   No   Have you had a seizure, or a brain or other nervous system problem?   No   During the past year, have you received a transfusion of blood or blood     products, or been given immune (gamma) globulin or antiviral drug?   No   For women: Are you pregnant or is there a chance you could become        pregnant during the next month?   No   Have you received any vaccinations in the past 4 weeks?   No     Immunization questionnaire answers were all negative.        Per orders of Dr. Oliver, injection of Pneumococcal 13 ( Prevnar 13), Shingrix given by An Santana. Patient instructed to remain in clinic for 15 minutes afterwards, and to report any adverse reaction to me immediately.       Screening performed by An Esther on 7/31/2018 at 4:29 PM.

## 2018-07-31 NOTE — PATIENT INSTRUCTIONS
[facility]    If you have any questions regarding to your visit please contact your care team:       Team Purple:   Clinic Hours Telephone Number   Dr. Livia Real   7am-7pm  Monday - Thursday   7am-5pm  Fridays  [phone]  (Appointment scheduling available 24/7)    Questions about your recent visit?   Team Line:  [phone]   Urgent Care - [place] and [facility] - 11am-9pm Monday-Friday Saturday-Sunday- 9am-5pm   [place] - 5pm-9pm Monday-Friday Saturday-Sunday- 9am-5pm  [phone] - [place]  [phone] [facility]       What options do I have for a visit other than an office visit? We offer electronic visits (e-visits) and telephone visits, when medically appropriate.  Please check with your medical insurance to see if these types of visits are covered, as you will be responsible for any charges that are not paid by your insurance.      You can use [employer] (secure electronic communication) to access to your chart, send your primary care provider a message, or make an appointment. Ask a team member how to get started.     For a price quote for your services, please call our Consumer Price Line at [phone] or our Imaging Cost estimation line at [phone] (for imaging tests).

## 2018-07-31 NOTE — MR AVS SNAPSHOT
After Visit Summary   7/31/2018    Livia Gill    MRN: [id number]           Patient Information     Date Of Birth          1955        Visit Information        Provider Department      7/31/2018 3:30 PM Livia Oliver MD [facility]        Today's Diagnoses     Type 2 diabetes mellitus without complication, without long-term current use of insulin (H)    -  1    Hyperlipidemia LDL goal <160        Need for prophylactic vaccination against Streptococcus pneumoniae (pneumococcus)        Visit for screening mammogram        Screening for diabetic retinopathy        Encounter for screening for HIV        Need for shingles vaccine          Care Instructions    [facility]    If you have any questions regarding to your visit please contact your care team:       Team Purple:   Clinic Hours Telephone Number   Dr. Livia Real   7am-7pm  Monday - Thursday   7am-5pm  Fridays  [phone]  (Appointment scheduling available 24/7)    Questions about your recent visit?   Team Line:  [phone]   Urgent Care - [place] and [facility] - 11am-9pm Monday-Friday Saturday-Sunday- 9am-5pm   [place] - 5pm-9pm Monday-Friday Saturday-Sunday- 9am-5pm  [phone] - [place]  [phone] - [place]       What options do I have for a visit other than an office visit? We offer electronic visits (e-visits) and telephone visits, when medically appropriate.  Please check with your medical insurance to see if these types of visits are covered, as you will be responsible for any charges that are not paid by your insurance.      You can use [employer] (secure electronic communication) to access to your chart, send your primary care provider a message, or make an appointment. Ask a team member how to get started.     For a price quote for your services, please call our Consumer Price Line at [phone] or our Imaging Cost  estimation line at [phone] (for imaging tests).              Follow-ups after your visit        Additional Services     OPHTHALMOLOGY ADULT REFERRAL       Your provider has referred you to: [facility]: [facility] [facility] [phone]   [email or web address]    Please be aware that coverage of these services is subject to the terms and limitations of your health insurance plan.  Call member services at your health plan with any benefit or coverage questions.      Please bring the following with you to your appointment:    (1) Any X-Rays, CTs or MRIs which have been performed.  Contact the facility where they were done to arrange for  prior to your scheduled appointment.    (2) List of current medications  (3) This referral request   (4) Any documents/labs given to you for this referral                  Future tests that were ordered for you today     Open Future Orders        Priority Expected Expires Ordered    MA SCREENING DIGITAL BILAT - Future  (s+30) Routine  7/30/2019 7/31/2018            Who to contact     If you have questions or need follow up information about today's clinic visit or your schedule please contact [facility] directly at [phone].  Normal or non-critical lab and imaging results will be communicated to you by MyChart, letter or phone within 4 business days after the clinic has received the results. If you do not hear from us within 7 days, please contact the clinic through MyChart or phone. If you have a critical or abnormal lab result, we will notify you by phone as soon as possible.  Submit refill requests through [employer] or call your pharmacy and they will forward the refill request to us. Please allow 3 business days for your refill to be completed.          Additional Information About Your Visit        YesPlz!hart Information     [employer] gives you secure access to your electronic health record. If you see a primary care provider, you  "can also send messages to your care team and make appointments. If you have questions, please call your primary care clinic.  If you do not have a primary care provider, please call [phone] and they will assist you.        Care EveryWhere ID     This is your Care EveryWhere ID. This could be used by other organizations to access your [place] medical records  [id number]        Your Vitals Were     Pulse Temperature Respirations Height Pulse Oximetry BMI (Body Mass Index)    93 98.2  F (36.8  C) (Oral) 20 5' 4.13\" (1.629 m) 93% 33.74 kg/m2       Blood Pressure from Last 3 Encounters:   07/31/18 100/60   10/20/17 112/62   06/26/17 126/64    Weight from Last 3 Encounters:   07/31/18 197 lb 6.4 oz (89.5 kg)   10/20/17 198 lb 8 oz (90 kg)   06/26/17 202 lb 8 oz (91.9 kg)              We Performed the Following     Albumin Random Urine Quantitative with Creat Ratio     Basic metabolic panel     HEMOGLOBIN A1C     HIV Screening     OPHTHALMOLOGY ADULT REFERRAL     Pneumococcal vaccine 13 valent PCV13 IM (Prevnar) [32762]     ZOSTER VACCINE RECOMBINANT ADJUVANTED IM NJX          Today's Medication Changes          These changes are accurate as of 7/31/18  4:16 PM.  If you have any questions, ask your nurse or doctor.               These medicines have changed or have updated prescriptions.        Dose/Directions    metFORMIN 500 MG tablet   Commonly known as:  GLUCOPHAGE   This may have changed:  additional instructions   Used for:  Type 2 diabetes mellitus without complication, without long-term current use of insulin (H)   Changed by:  Livia Oliver MD        Dose:  500 mg   Take 1 tablet (500 mg) by mouth 2 times daily (with meals)   Quantity:  180 tablet   Refills:  4            Where to get your medicines      These medications were sent to [facility]/pharmacy #[id number] - [place], [place] - [address] [address] [place] [address], [place] [address]     Phone:  [phone]     " lisinopril 10 MG tablet    metFORMIN 500 MG tablet    rosuvastatin 40 MG tablet                Primary Care Provider Office Phone # Fax #    Livia Oliver [phone] [address] [address]        Equal Access to Services     JAY CARREON : Hadii aad ku hadasho Soomaali, waaxda luqadaha, qaybta kaalmada adeegyada, waxay paulin hayirinan adechiqui mdeley lacorina waters. So [facility] [phone].    ATENCIÓN: Si habla español, tiene a jaimes disposición servicios gratuitos de asistencia lingüística. Joss al [phone].    We comply with applicable federal civil rights laws and [place] laws. We do not discriminate on the basis of race, color, national origin, age, disability, sex, sexual orientation, or gender identity.            Thank you!     Thank you for choosing [facility]  for your care. Our goal is always to provide you with excellent care. Hearing back from our patients is one way we can continue to improve our services. Please take a few minutes to complete the written survey that you may receive in the mail after your visit with us. Thank you!             Your Updated Medication List - Protect others around you: Learn how to safely use, store and throw away your medicines at www.disposemymeds.org.          This list is accurate as of 7/31/18  4:16 PM.  Always use your most recent med list.                   Brand Name Dispense Instructions for use Diagnosis    aspirin 81 MG tablet     30 tablet    Take 1 tablet (81 mg) by mouth daily    Type 2 diabetes mellitus without complication, without long-term current use of insulin (H)       blood glucose monitoring lancets     1 Box    Use to test blood sugar 1 times daily or as directed.  Ok to substitute alternative if insurance prefers.    Diabetes mellitus (H)       blood glucose monitoring test strip    ACCU-CHEK SMARTVIEW    100 each    Use to test blood sugar 2 times daily for 2 weeks then decrease down to  1 time per day. Ok to substitute alternative if insurance prefers.    Diabetes mellitus (H)       IBUPROFEN PO           lisinopril 10 MG tablet    PRINIVIL/ZESTRIL    90 tablet    TAKE 1 TABLET (10 MG) BY MOUTH DAILY    Type 2 diabetes mellitus without complication, without long-term current use of insulin (H)       metFORMIN 500 MG tablet    GLUCOPHAGE    180 tablet    Take 1 tablet (500 mg) by mouth 2 times daily (with meals)    Type 2 diabetes mellitus without complication, without long-term current use of insulin (H)       MULTIVITAMIN TABS   OR      1 TABLET DAILY    Arthralgia of shoulder       rosuvastatin 40 MG tablet    CRESTOR    90 tablet    Take 1 tablet (40 mg) by mouth daily    Hyperlipidemia LDL goal <160

## 2018-07-31 NOTE — LETTER
[address] [place] [address]    August 2, 2018    Livia Gill  [address] [address]    Dear Livia,    The rest of your lab tests are looking good. Continue your healthy lifestyle. See me again in 6 months    Enclosed is a copy of your results.     Results for orders placed or performed in visit on 07/31/18   HIV Screening   Result Value Ref Range    HIV Antigen Antibody Combo Nonreactive NR^Nonreactive       HEMOGLOBIN A1C   Result Value Ref Range    Hemoglobin A1C 6.1 (H) 0 - 5.6 %   Albumin Random Urine Quantitative with Creat Ratio   Result Value Ref Range    Creatinine Urine 173 mg/dL    Albumin Urine mg/L 14 mg/L    Albumin Urine mg/g Cr 7.98 0 - 25 mg/g Cr   Basic metabolic panel   Result Value Ref Range    Sodium 139 133 - 144 mmol/L    Potassium 3.9 3.4 - 5.3 mmol/L    Chloride 103 94 - 109 mmol/L    Carbon Dioxide 30 20 - 32 mmol/L    Anion Gap 6 3 - 14 mmol/L    Glucose 104 (H) 70 - 99 mg/dL    Urea Nitrogen 17 7 - 30 mg/dL    Creatinine 1.01 0.52 - 1.04 mg/dL    GFR Estimate 55 (L) >60 mL/min/1.7m2    GFR Estimate If Black 67 >60 mL/min/1.7m2    Calcium 9.4 8.5 - 10.1 mg/dL   If you have any questions or concerns, please call myself or my nurse at [phone].      Sincerely,      Livia Oliver MD/anjana

## 2018-08-01 LAB
ANION GAP SERPL CALCULATED.3IONS-SCNC: 6 MMOL/L (ref 3–14)
BUN SERPL-MCNC: 17 MG/DL (ref 7–30)
CALCIUM SERPL-MCNC: 9.4 MG/DL (ref 8.5–10.1)
CHLORIDE SERPL-SCNC: 103 MMOL/L (ref 94–109)
CO2 SERPL-SCNC: 30 MMOL/L (ref 20–32)
CREAT SERPL-MCNC: 1.01 MG/DL (ref 0.52–1.04)
GFR SERPL CREATININE-BSD FRML MDRD: 55 ML/MIN/1.7M2
GLUCOSE SERPL-MCNC: 104 MG/DL (ref 70–99)
HIV 1+2 AB+HIV1 P24 AG SERPL QL IA: NONREACTIVE
POTASSIUM SERPL-SCNC: 3.9 MMOL/L (ref 3.4–5.3)
SODIUM SERPL-SCNC: 139 MMOL/L (ref 133–144)

## 2018-09-14 ENCOUNTER — RADIANT APPOINTMENT (OUTPATIENT)
Dept: MAMMOGRAPHY | Facility: CLINIC | Age: 63
End: 2018-09-14
Payer: COMMERCIAL

## 2018-09-14 DIAGNOSIS — Z12.31 VISIT FOR SCREENING MAMMOGRAM: ICD-10-CM

## 2018-09-14 PROCEDURE — 77067 SCR MAMMO BI INCL CAD: CPT | Mod: TC

## 2018-09-20 ENCOUNTER — DOCUMENTATION ONLY (OUTPATIENT)
Dept: LAB | Facility: CLINIC | Age: 63
End: 2018-09-20

## 2018-09-20 DIAGNOSIS — E11.9 TYPE 2 DIABETES MELLITUS WITHOUT COMPLICATION, WITHOUT LONG-TERM CURRENT USE OF INSULIN (H): ICD-10-CM

## 2018-09-20 DIAGNOSIS — E78.5 HYPERLIPIDEMIA LDL GOAL <160: Primary | ICD-10-CM

## 2018-09-20 NOTE — PROGRESS NOTES
Pt has a lab appt. She is coming in for blood biometric screen? Please place future lab orders for this.

## 2018-10-05 ENCOUNTER — ALLIED HEALTH/NURSE VISIT (OUTPATIENT)
Dept: NURSING | Facility: CLINIC | Age: 63
End: 2018-10-05
Payer: COMMERCIAL

## 2018-10-05 ENCOUNTER — TELEPHONE (OUTPATIENT)
Dept: FAMILY MEDICINE | Facility: CLINIC | Age: 63
End: 2018-10-05

## 2018-10-05 VITALS
SYSTOLIC BLOOD PRESSURE: 102 MMHG | HEIGHT: 65 IN | RESPIRATION RATE: 11 BRPM | DIASTOLIC BLOOD PRESSURE: 72 MMHG | BODY MASS INDEX: 32.82 KG/M2 | OXYGEN SATURATION: 100 % | WEIGHT: 197 LBS

## 2018-10-05 DIAGNOSIS — Z00.00 PREVENTATIVE HEALTH CARE: Primary | ICD-10-CM

## 2018-10-05 DIAGNOSIS — E78.5 HYPERLIPIDEMIA LDL GOAL <160: ICD-10-CM

## 2018-10-05 DIAGNOSIS — E11.9 TYPE 2 DIABETES MELLITUS WITHOUT COMPLICATION, WITHOUT LONG-TERM CURRENT USE OF INSULIN (H): ICD-10-CM

## 2018-10-05 LAB
CHOLEST SERPL-MCNC: 105 MG/DL
GLUCOSE SERPL-MCNC: 131 MG/DL (ref 70–99)
HDLC SERPL-MCNC: 35 MG/DL
LDLC SERPL CALC-MCNC: 15 MG/DL
NONHDLC SERPL-MCNC: 70 MG/DL
TRIGL SERPL-MCNC: 274 MG/DL

## 2018-10-05 PROCEDURE — 82947 ASSAY GLUCOSE BLOOD QUANT: CPT | Performed by: FAMILY MEDICINE

## 2018-10-05 PROCEDURE — 80061 LIPID PANEL: CPT | Performed by: FAMILY MEDICINE

## 2018-10-05 PROCEDURE — 36415 COLL VENOUS BLD VENIPUNCTURE: CPT | Performed by: FAMILY MEDICINE

## 2018-10-05 NOTE — MR AVS SNAPSHOT
After Visit Summary   10/5/2018    Livia Gill    MRN: [id number]           Patient Information     Date Of Birth          1955        Visit Information        Provider Department      10/5/2018 9:20 AM [facility] ANCILLARY [facility]        Today's Diagnoses     Preventative health care    -  1       Follow-ups after your visit        Your next 10 appointments already scheduled     Oct 05, 2018  9:20 AM CDT   Nurse Only with [facility] ANCILLARY   [facility] ([facility]    [address] [facility] [id number] [phone]            Oct 08, 2018  3:30 PM CDT   New Visit with Angelo Ramachandrna MD   [facility] ([facility])    [address]  [place] [id number] [phone]              Who to contact     If you have questions or need follow up information about today's clinic visit or your schedule please contact [facility] directly at [phone].  Normal or non-critical lab and imaging results will be communicated to you by CertiRxhart, letter or phone within 4 business days after the clinic has received the results. If you do not hear from us within 7 days, please contact the clinic through [email or web address] or phone. If you have a critical or abnormal lab result, we will notify you by phone as soon as possible.  Submit refill requests through [employer] or call your pharmacy and they will forward the refill request to us. Please allow 3 business days for your refill to be completed.          Additional Information About Your Visit        CertiRxhart Information     [employer] gives you secure access to your electronic health record. If you see a primary care provider, you can also send messages to your care team and make appointments. If you have questions, please call your primary care clinic.  If you do not have a primary care provider, please call [phone] and they will assist you.        Care EveryWhere ID     This  "is your Care EveryWhere ID. This could be used by other organizations to access your [facility] medical records  [id number]        Your Vitals Were     Respirations Height Pulse Oximetry BMI (Body Mass Index)          11 5' 4.5\" (1.638 m) 100% 33.29 kg/m2         Blood Pressure from Last 3 Encounters:   10/05/18 102/72   07/31/18 100/60   10/20/17 112/62    Weight from Last 3 Encounters:   10/05/18 197 lb (89.4 kg)   07/31/18 197 lb 6.4 oz (89.5 kg)   10/20/17 198 lb 8 oz (90 kg)              Today, you had the following     No orders found for display       Primary Care Provider Office Phone # Fax #    Livia Oliver [phone] [address] [address]        Equal Access to Services     [facility] : Hadii aad ku hadasho Soomaali, waaxda luqadaha, qaybta kaalmada adeegyada, waxay idiin hayaan dillon kharanydia laToanaan . So [facility] [phone].    ATENCIÓN: Si habla español, tiene a jaimes disposición servicios gratuitos de asistencia lingüística. Joss al [phone].    We comply with applicable federal civil rights laws and [place] laws. We do not discriminate on the basis of race, color, national origin, age, disability, sex, sexual orientation, or gender identity.            Thank you!     Thank you for choosing [facility]  for your care. Our goal is always to provide you with excellent care. Hearing back from our patients is one way we can continue to improve our services. Please take a few minutes to complete the written survey that you may receive in the mail after your visit with us. Thank you!             Your Updated Medication List - Protect others around you: Learn how to safely use, store and throw away your medicines at www.disposemymeds.org.          This list is accurate as of 10/5/18  9:17 AM.  Always use your most recent med list.                   Brand Name Dispense Instructions for use Diagnosis    aspirin 81 MG tablet     30 tablet "    Take 1 tablet (81 mg) by mouth daily    Type 2 diabetes mellitus without complication, without long-term current use of insulin (H)       blood glucose monitoring lancets     1 Box    Use to test blood sugar 1 times daily or as directed.  Ok to substitute alternative if insurance prefers.    Diabetes mellitus (H)       blood glucose monitoring test strip    ACCU-CHEK SMARTVIEW    100 each    Use to test blood sugar 2 times daily for 2 weeks then decrease down to 1 time per day. Ok to substitute alternative if insurance prefers.    Diabetes mellitus (H)       IBUPROFEN PO           lisinopril 10 MG tablet    PRINIVIL/ZESTRIL    90 tablet    TAKE 1 TABLET (10 MG) BY MOUTH DAILY    Type 2 diabetes mellitus without complication, without long-term current use of insulin (H)       metFORMIN 500 MG tablet    GLUCOPHAGE    180 tablet    Take 1 tablet (500 mg) by mouth 2 times daily (with meals)    Type 2 diabetes mellitus without complication, without long-term current use of insulin (H)       MULTIVITAMIN TABS   OR      1 TABLET DAILY    Arthralgia of shoulder       rosuvastatin 40 MG tablet    CRESTOR    90 tablet    Take 1 tablet (40 mg) by mouth daily    Hyperlipidemia LDL goal <160

## 2018-10-05 NOTE — NURSING NOTE
"Patient came in today with biometric form for work which requires weight, height, BP and some blood work. Patient has lab only appointment. Vitals were completed. MA informed patient to bring form to the  to be dropped off.   /72  Resp 11  Ht 5' 4.5\" (1.638 m)  Wt 197 lb (89.4 kg)  SpO2 100%  BMI 33.29 kg/m2  Lesli ALBARRAN CMA ([facility])    "

## 2018-10-05 NOTE — TELEPHONE ENCOUNTER
Reason for Call:  Form, our goal is to have forms completed with 72 hours, however, some forms may require a visit or additional information.    Type of letter, form or note:  medical    Who is the form from?: Patient    Where did the form come from: Patient or family brought in       What clinic location was the form placed at?: [facility]    Where the form was placed: 's Box    What number is listed as a contact on the form?: [phone]       Additional comments: please call patient when form is completed and faxed to let her know that was completed    Call taken on 10/5/2018 at 9:15 AM by Hyacinth Barrera

## 2018-10-29 ENCOUNTER — TRANSFERRED RECORDS (OUTPATIENT)
Dept: HEALTH INFORMATION MANAGEMENT | Facility: CLINIC | Age: 63
End: 2018-10-29

## 2018-10-29 LAB — RETINOPATHY: NEGATIVE

## 2018-12-04 ENCOUNTER — TELEPHONE (OUTPATIENT)
Dept: FAMILY MEDICINE | Facility: CLINIC | Age: 63
End: 2018-12-04

## 2018-12-04 NOTE — TELEPHONE ENCOUNTER
Called patient let her know provider is out of the office she will ask her at her appointment on 12/13/2018 who will be the best fit.  Marina Gipson,

## 2018-12-04 NOTE — TELEPHONE ENCOUNTER
Reason for Call:  Other call back    Detailed comments: Patient is calling wanting to talk about who she should see after Dr Oliver retires. Please call back    Phone Number Patient can be reached at: Home number on file [phone] (home) or Cell number on file:    Telephone Information:   Mobile [phone]       Best Time: any    Can we leave a detailed message on this number? YES    Call taken on 12/4/2018 at 10:53 AM by Virginia Rosales

## 2018-12-13 ENCOUNTER — OFFICE VISIT (OUTPATIENT)
Dept: FAMILY MEDICINE | Facility: CLINIC | Age: 63
End: 2018-12-13
Payer: COMMERCIAL

## 2018-12-13 VITALS
SYSTOLIC BLOOD PRESSURE: 100 MMHG | HEART RATE: 91 BPM | TEMPERATURE: 97.1 F | OXYGEN SATURATION: 95 % | DIASTOLIC BLOOD PRESSURE: 60 MMHG | BODY MASS INDEX: 34.15 KG/M2 | RESPIRATION RATE: 20 BRPM | WEIGHT: 200 LBS | HEIGHT: 64 IN

## 2018-12-13 DIAGNOSIS — K43.9 VENTRAL HERNIA WITHOUT OBSTRUCTION OR GANGRENE: ICD-10-CM

## 2018-12-13 DIAGNOSIS — E11.9 TYPE 2 DIABETES MELLITUS WITHOUT COMPLICATION, WITHOUT LONG-TERM CURRENT USE OF INSULIN (H): Primary | ICD-10-CM

## 2018-12-13 DIAGNOSIS — Z13.89 SCREENING FOR DIABETIC PERIPHERAL NEUROPATHY: ICD-10-CM

## 2018-12-13 DIAGNOSIS — Z23 NEED FOR PROPHYLACTIC VACCINATION AND INOCULATION AGAINST INFLUENZA: ICD-10-CM

## 2018-12-13 LAB — HBA1C MFR BLD: 6.5 % (ref 0–5.6)

## 2018-12-13 PROCEDURE — 90471 IMMUNIZATION ADMIN: CPT | Performed by: FAMILY MEDICINE

## 2018-12-13 PROCEDURE — 99214 OFFICE O/P EST MOD 30 MIN: CPT | Mod: 25 | Performed by: FAMILY MEDICINE

## 2018-12-13 PROCEDURE — 99207 C FOOT EXAM  NO CHARGE: CPT | Performed by: FAMILY MEDICINE

## 2018-12-13 PROCEDURE — 36415 COLL VENOUS BLD VENIPUNCTURE: CPT | Performed by: FAMILY MEDICINE

## 2018-12-13 PROCEDURE — 90662 IIV NO PRSV INCREASED AG IM: CPT | Performed by: FAMILY MEDICINE

## 2018-12-13 PROCEDURE — 83036 HEMOGLOBIN GLYCOSYLATED A1C: CPT | Performed by: FAMILY MEDICINE

## 2018-12-13 ASSESSMENT — MIFFLIN-ST. JEOR: SCORE: 1444.94

## 2018-12-13 NOTE — PROGRESS NOTES

## 2018-12-13 NOTE — NURSING NOTE
Prior to injection, verified patient identity using patient's name and date of birth.  Due to injection administration, patient instructed to remain in clinic for 15 minutes  afterwards, and to report any adverse reaction to me immediately.    High dose Flu Shot    Drug Amount Wasted:  None.  Vial/Syringe: Syringe   Lisa Santana MA

## 2018-12-13 NOTE — PROGRESS NOTES
SUBJECTIVE:   Livia Gill is a 63 year old female who presents to clinic today for the following health issues:      Diabetes Follow-up      Patient is checking blood sugars: not at all    Diabetic concerns: None     Symptoms of hypoglycemia (low blood sugar): none     Paresthesias (numbness or burning in feet) or sores: No     Date of last diabetic eye exam: 10/29/2018 @ [facility]    BP Readings from Last 2 Encounters:   12/13/18 100/60   10/05/18 102/72     Hemoglobin A1C (%)   Date Value   07/31/2018 6.1 (H)   10/20/2017 6.1 (H)     LDL Cholesterol Calculated (mg/dL)   Date Value   10/05/2018 15   01/15/2018 11       Diabetes Management Resources  Hyperlipidemia Follow-Up      Rate your low fat/cholesterol diet?: fair    Taking statin?  Yes, no muscle aches from statin    Other lipid medications/supplements?:  none      Amount of exercise or physical activity: None    Problems taking medications regularly: No    Medication side effects: none    Diet: regular (no restrictions)             Problem list and histories reviewed & adjusted, as indicated.  Additional history: as documented    Patient Active Problem List   Diagnosis     Hyperlipidemia LDL goal <160     Cervical cancer screening     Type 2 diabetes mellitus without complication, without long-term current use of insulin (H)     Class 1 obesity due to excess calories with serious comorbidity and body mass index (BMI) of 33.0 to 33.9 in adult     Past Surgical History:   Procedure Laterality Date     C NONSPECIFIC PROCEDURE  1989    laparscopy for infertility     CATARACT IOL, RT/LT Right 1/2015    right cataract      COLONOSCOPY  00, 06, 12, 17    polyps, every 5 years     D & C  1989     HC FEMUR/KNEE SURG UNLISTED      Lt. knee dislocation     TONSILLECTOMY & ADENOIDECTOMY      age 14       Social History     Tobacco Use     Smoking status: Never Smoker     Smokeless tobacco: Never Used   Substance Use Topics     Alcohol use: No      Family History   Problem Relation Age of Onset     Diabetes Father      Other Cancer Sister 61        Ovarian Cancer         Current Outpatient Medications   Medication Sig Dispense Refill     aspirin 81 MG tablet Take 1 tablet (81 mg) by mouth daily 30 tablet      IBUPROFEN PO        lisinopril (PRINIVIL/ZESTRIL) 10 MG tablet TAKE 1 TABLET (10 MG) BY MOUTH DAILY 90 tablet 4     metFORMIN (GLUCOPHAGE) 500 MG tablet Take 1 tablet (500 mg) by mouth 2 times daily (with meals) 180 tablet 4     MULTIVITAMIN TABS   OR 1 TABLET DAILY       rosuvastatin (CRESTOR) 40 MG tablet Take 1 tablet (40 mg) by mouth daily 90 tablet 4     No Known Allergies  Recent Labs   Lab Test 12/13/18  1441 10/05/18  0846 07/31/18  1530 01/15/18  0908 10/20/17  0932  03/22/17  1557  10/19/16  0746  07/25/12  0919  12/07/10  0940   A1C 6.5*  --  6.1*  --  6.1*   < > 6.2*   < >  --   --   --   --   --    LDL  --  15  --  11  --   --   --   --  35   < > 59  --  61   HDL  --  35*  --  41*  --   --   --   --  38*   < > 43*  --  33*   TRIG  --  274*  --  197*  --   --   --   --  215*   < > 214*  --  253*   ALT  --   --   --   --   --   --   --   --   --   --  35  --  48   CR  --   --  1.01 0.99  --   --  0.85  --   --   --  0.87  --  1.00   GFRESTIMATED  --   --  55* 56*  --   --  68  --   --   --  67  --  57*   GFRESTBLACK  --   --  67 68  --   --  82  --   --   --  81  --  69   POTASSIUM  --   --  3.9 3.8  --   --  4.7  --   --   --  4.3   < >  --    TSH  --   --   --   --   --   --  2.02  --   --   --  2.66  --   --     < > = values in this interval not displayed.      BP Readings from Last 3 Encounters:   12/13/18 100/60   10/05/18 102/72   07/31/18 100/60    Wt Readings from Last 3 Encounters:   12/13/18 90.7 kg (200 lb)   10/05/18 89.4 kg (197 lb)   07/31/18 89.5 kg (197 lb 6.4 oz)                  Labs reviewed in EPIC    Reviewed and updated as needed this visit by clinical staff  Tobacco  Allergies  Meds  Med Hx  Surg Hx  Fam Hx  Soc Hx  "     Reviewed and updated as needed this visit by Provider        Immunization History   Administered Date(s) Administered     Influenza Vaccine IM 3yrs+ 4 Valent IIV4 09/17/2014, 12/02/2015, 09/22/2016, 10/20/2017     Mantoux Tuberculin Skin Test 06/30/1999     Pneumo Conj 13-V (2010&after) 07/31/2018     Pneumococcal 23 valent 12/20/2016     TD (ADULT, 7+) 07/25/2012     TDAP Vaccine (Adacel) 09/16/2013     Tetanus 05/17/2001     Zoster vaccine recombinant adjuvanted (SHINGRIX) 07/31/2018     Zoster vaccine, live 01/15/2015        ROS:  This 63 year old female is here today to check her diabetes. She admits to eating more candy recently. She doesn't check her blood sugars.  She also has noticed a bulge or fullness in her lower abdomen, especially in the mornings. It hurts a little. Wonders what that could be. All other review of systems are negative  Personal, family, and social history reviewed with patient and revised.    CONSTITUTIONAL: NEGATIVE for fever, chills, change in weight  ENT/MOUTH: NEGATIVE for ear, mouth and throat problems  RESP: NEGATIVE for significant cough or SOB  CV: NEGATIVE for chest pain, palpitations or peripheral edema    OBJECTIVE:     /60   Pulse 91   Temp 97.1  F (36.2  C) (Oral)   Resp 20   Ht 1.638 m (5' 4.49\")   Wt 90.7 kg (200 lb)   SpO2 95%   BMI 33.81 kg/m    Body mass index is 33.81 kg/m .  GENERAL: healthy, alert and no distress  NECK: no adenopathy, no asymmetry, masses, or scars and thyroid normal to palpation  RESP: lungs clear to auscultation - no rales, rhonchi or wheezes  CV: regular rate and rhythm, normal S1 S2, no S3 or S4, no murmur, click or rub, no peripheral edema and peripheral pulses strong  ABDOMEN: soft, nontender, but has truncal obesity and what I believe to be a ventral hernia below her waist. It is tender over her left lateral aspect.   MS: no gross musculoskeletal defects noted, no edema  Diabetic foot exam: normal DP and PT pulses, no trophic " changes or ulcerative lesions, normal sensory exam and normal monofilament exam  Well hydrated  Well nourished  Well groomed  Alert and oriented X 3  Good spirits  Brisk gait with no shortness of breath     Diagnostic Test Results:  Results for orders placed or performed in visit on 12/13/18 (from the past 24 hour(s))   Hemoglobin A1c   Result Value Ref Range    Hemoglobin A1C 6.5 (H) 0 - 5.6 %       ASSESSMENT/PLAN:              1. Type 2 diabetes mellitus without complication, without long-term current use of insulin (H)  Good control so far, but she is at risk due to her lack of will power with food  - FOOT EXAM  NO CHARGE [07163.625]  - Hemoglobin A1c    2. Ventral hernia without obstruction or gangrene  As above   - GENERAL SURG ADULT REFERRAL  - CT Abdomen Pelvis w Contrast; Future    3. Screening for diabetic peripheral neuropathy  due  - FOOT EXAM  NO CHARGE [21987.395]     return to clinic 3 months diabetes check     AZIZA SHIELDS MD  [facility]

## 2018-12-13 NOTE — PATIENT INSTRUCTIONS
[facility]    If you have any questions regarding to your visit please contact your care team:       Team [facility]:   Clinic Hours Telephone Number   Dr. Livia Real   7am-7pm  Monday - Thursday   7am-5pm  Fridays  [phone]  (Appointment scheduling available 24/7)   Urgent Care - [place] and [facility] - 11am-9pm Monday-Friday Saturday-Sunday- 9am-5pm   [place] - 5pm-9pm Monday-Friday Saturday-Sunday- 9am-5pm  [phone] - [place]  [phone] - [place]       What options do I have for a visit other than an office visit? We offer electronic visits (e-visits) and telephone visits, when medically appropriate.  Please check with your medical insurance to see if these types of visits are covered, as you will be responsible for any charges that are not paid by your insurance.      You can use [employer] (secure electronic communication) to access to your chart, send your primary care provider a message, or make an appointment. Ask a team member how to get started.     For a price quote for your services, please call our Consumer Price Line at [phone] or our Imaging Cost estimation line at [phone] (for imaging tests).

## 2018-12-24 ENCOUNTER — ANCILLARY PROCEDURE (OUTPATIENT)
Dept: CT IMAGING | Facility: CLINIC | Age: 63
End: 2018-12-24
Attending: FAMILY MEDICINE
Payer: COMMERCIAL

## 2018-12-24 DIAGNOSIS — K43.9 VENTRAL HERNIA WITHOUT OBSTRUCTION OR GANGRENE: ICD-10-CM

## 2018-12-24 PROCEDURE — 74177 CT ABD & PELVIS W/CONTRAST: CPT | Mod: TC

## 2018-12-24 PROCEDURE — 82565 ASSAY OF CREATININE: CPT

## 2018-12-24 RX ORDER — IOPAMIDOL 755 MG/ML
98 INJECTION, SOLUTION INTRAVASCULAR ONCE
Status: COMPLETED | OUTPATIENT
Start: 2018-12-24 | End: 2018-12-24

## 2018-12-24 RX ADMIN — IOPAMIDOL 98 ML: 755 INJECTION, SOLUTION INTRAVASCULAR at 09:50

## 2018-12-26 ENCOUNTER — MYC MEDICAL ADVICE (OUTPATIENT)
Dept: FAMILY MEDICINE | Facility: CLINIC | Age: 63
End: 2018-12-26

## 2018-12-26 LAB
CREAT BLD-MCNC: 0.9 MG/DL (ref 0.5–1.2)
GFR SERPL CREATININE-BSD FRML MDRD: 67 ML/MIN/{1.73_M2}
GFRB: 63

## 2018-12-27 ENCOUNTER — MYC MEDICAL ADVICE (OUTPATIENT)
Dept: FAMILY MEDICINE | Facility: CLINIC | Age: 63
End: 2018-12-27

## 2019-01-07 ENCOUNTER — OFFICE VISIT (OUTPATIENT)
Dept: SURGERY | Facility: CLINIC | Age: 64
End: 2019-01-07
Payer: COMMERCIAL

## 2019-01-07 VITALS
HEIGHT: 64 IN | WEIGHT: 197 LBS | DIASTOLIC BLOOD PRESSURE: 76 MMHG | SYSTOLIC BLOOD PRESSURE: 119 MMHG | HEART RATE: 82 BPM | BODY MASS INDEX: 33.63 KG/M2

## 2019-01-07 DIAGNOSIS — K42.9 UMBILICAL HERNIA WITHOUT OBSTRUCTION AND WITHOUT GANGRENE: Primary | ICD-10-CM

## 2019-01-07 PROCEDURE — 99243 OFF/OP CNSLTJ NEW/EST LOW 30: CPT | Performed by: SURGERY

## 2019-01-07 ASSESSMENT — MIFFLIN-ST. JEOR: SCORE: 1423.59

## 2019-01-07 NOTE — PROGRESS NOTES
Patient seen in consultation for hernia by Livia Oliver    HPI:  Patient is a 63 year old female  with complaints of palpable lump just below belly button  Also gas and bloating, burping  Also gets a loose bowel movement after eating about once a day- moves through quickly  The patient noticed the symptoms about 1 month ago.  Not painful. Saw bulge in mirror  No change since first seeing it  Mostly sees in morning. Feels firm when pushes on it.   nothing makes the episode better.  Patient has family history of hernia problems in sister.  Had laparoscopy maybe 1989  In past used to get drainage from umbilicus that had an odor. Since making sure stays clean has not had in a long while    Review Of Systems    Skin: negative  Ears/Nose/Throat: negative  Respiratory: No shortness of breath, dyspnea on exertion, cough, or hemoptysis  Cardiovascular: negative  Gastrointestinal: as above. Can get a feeling like something in the chest sometimes and has to cough to improve, unsure if reflux. Doesn't really get heartburn  Genitourinary: negative  Musculoskeletal: negative  Neurologic: negative  Hematologic/Lymphatic/Immunologic: negative  Endocrine: negative and diabetes      Past Medical History:   Diagnosis Date     Colon polyps 12/2017    screen every 5 years      Hypercholesterolemia      Hyperglycemia      Metabolic syndrome      Obesity      Vitamin D deficiency    Diabetes type 2 noninsulin    Past Surgical History:   Procedure Laterality Date     C NONSPECIFIC PROCEDURE  1989    laparscopy for infertility     CATARACT IOL, RT/LT Right 1/2015    right cataract      COLONOSCOPY  00, 06, 12, 17    polyps, every 5 years     D & C  1989     HC FEMUR/KNEE SURG UNLISTED      Lt. knee dislocation     TONSILLECTOMY & ADENOIDECTOMY      age 14       Social History     Socioeconomic History     Marital status:      Spouse name: Not on file     Number of children: Not on file     Years of education: Not on file      "Highest education level: Not on file   Social Needs     Financial resource strain: Not on file     Food insecurity - worry: Not on file     Food insecurity - inability: Not on file     Transportation needs - medical: Not on file     Transportation needs - non-medical: Not on file   Occupational History     Not on file   Tobacco Use     Smoking status: Never Smoker     Smokeless tobacco: Never Used   Substance and Sexual Activity     Alcohol use: No     Drug use: No     Sexual activity: No   Other Topics Concern     Parent/sibling w/ CABG, MI or angioplasty before 65F 55M? No   Social History Narrative     Not on file       Current Outpatient Medications   Medication Sig Dispense Refill     aspirin 81 MG tablet Take 1 tablet (81 mg) by mouth daily 30 tablet      IBUPROFEN PO        lisinopril (PRINIVIL/ZESTRIL) 10 MG tablet TAKE 1 TABLET (10 MG) BY MOUTH DAILY 90 tablet 4     metFORMIN (GLUCOPHAGE) 500 MG tablet Take 1 tablet (500 mg) by mouth 2 times daily (with meals) 180 tablet 4     MULTIVITAMIN TABS   OR 1 TABLET DAILY       rosuvastatin (CRESTOR) 40 MG tablet Take 1 tablet (40 mg) by mouth daily 90 tablet 4       Medications and history reviewed    Physical exam:  Vitals: /76   Pulse 82   Ht 1.626 m (5' 4\")   Wt 89.4 kg (197 lb)   BMI 33.81 kg/m    BMI= Body mass index is 33.81 kg/m .    Constitutional: healthy, alert and no distress  Head: Normocephalic. No masses, lesions, tenderness or abnormalities  Cardiovascular: negative, PMI normal. No lifts, heaves, or thrills. RRR. No murmurs, clicks gallops or rub  Respiratory: negative, Percussion normal. Good diaphragmatic excursion. Lungs clear  Gastrointestinal: Abdomen soft, non-tender. BS normal. No masses, organomegaly, positive findings: umbilical hernia, small, reducible. Obese. No other masses or abnormalities felt in area  : Deferred  Musculoskeletal: extremities normal- no gross deformities noted, gait normal and normal muscle tone  Skin: no " suspicious lesions or rashes  Psychiatric: mentation appears normal and affect normal/bright  Hematologic/Lymphatic/Immunologic: Normal cervical lymph nodes  Patient able to get up on table without difficulty.      Imaging shows: personally viewed  CT ABDOMEN AND PELVIS WITH CONTRAST   12/24/2018 9:59 AM      HISTORY: Abdominal pain.     TECHNIQUE: 98 mL Isovue-370 IV were administered. After contrast  administration, volumetric helical sections were acquired from the  lung bases to the ischial tuberosities. Coronal images were also  reconstructed. Radiation dose for this scan was reduced using  automated exposure control, adjustment of the mA and/or kV according  to patient size, or iterative reconstruction technique.     COMPARISON: None.      FINDINGS: No bowel obstruction. Sigmoid diverticulosis, without  convincing evidence for diverticulitis. Unremarkable appendix. No free  fluid in the pelvis. Mild atherosclerotic aortoiliac calcification. A  2.1 cm low-density lesion in the lateral segment of the left hepatic  lobe anteriorly may represent a hemangioma but is not definitively  characterized. Few small right renal cysts are noted. The liver,  spleen, gallbladder, adrenal glands, pancreas, and kidneys are  otherwise unremarkable. No hydronephrosis.  There is a small  fat-containing paraumbilical hernia. The visualized lung bases are  clear. Degenerative changes are noted in the lumbar spine.                                                                      IMPRESSION:   1. No acute abnormality in the abdomen or pelvis. No cause for  abdominal pain is identified.  2. Small fat-containing paraumbilical hernia is noted.    Assessment:     ICD-10-CM    1. Umbilical hernia without obstruction and without gangrene K42.9      Plan: Discussed CT findings and exam showing small umbilical hernia containing fat. This really is asymptomatic at this time as would be unrelated to her bowel function/diarrhea issues  mentioned, and repair would not effect that. Discussed repair options if things change and this becomes symptomatic and to go to ER if acute pain or other incarceration/strangulation symptoms. No other questions at this time. Follow up as needed as no plans for repair at this time.    Emanuel Mosley MD

## 2019-01-07 NOTE — PATIENT INSTRUCTIONS
There is small umbilical hernia on CT scan  If becomes painful or otherwise symptomatic can look at repair  No plans for surgery at this time  This hernia would be unrelated or have no effect on the bowel function issues you described  If becomes acutely painful, unable to push back in go to ER to have looked at [facility] needs emergent repair

## 2019-01-07 NOTE — LETTER
1/7/2019         RE: Livia Gill  [address] [id number]        Dear Colleague,    Thank you for referring your patient, Livia Gill, to the [facility]. Please see a copy of my visit note below.    Patient seen in consultation for hernia by Livia Oliver    HPI:  Patient is a 63 year old female  with complaints of palpable lump just below belly button  Also gas and bloating, burping  Also gets a loose bowel movement after eating about once a day- moves through quickly  The patient noticed the symptoms about 1 month ago.  Not painful. Saw bulge in mirror  No change since first seeing it  Mostly sees in morning. Feels firm when pushes on it.   nothing makes the episode better.  Patient has family history of hernia problems in sister.  Had laparoscopy maybe 1989  In past used to get drainage from umbilicus that had an odor. Since making sure stays clean has not had in a long while    Review Of Systems    Skin: negative  Ears/Nose/Throat: negative  Respiratory: No shortness of breath, dyspnea on exertion, cough, or hemoptysis  Cardiovascular: negative  Gastrointestinal: as above. Can get a feeling like something in the chest sometimes and has to cough to improve, unsure if reflux. Doesn't really get heartburn  Genitourinary: negative  Musculoskeletal: negative  Neurologic: negative  Hematologic/Lymphatic/Immunologic: negative  Endocrine: negative and diabetes      Past Medical History:   Diagnosis Date     Colon polyps 12/2017    screen every 5 years      Hypercholesterolemia      Hyperglycemia      Metabolic syndrome      Obesity      Vitamin D deficiency    Diabetes type 2 noninsulin    Past Surgical History:   Procedure Laterality Date     C NONSPECIFIC PROCEDURE  1989    laparscopy for infertility     CATARACT IOL, RT/LT Right 1/2015    right cataract      COLONOSCOPY  00, 06, 12, 17    polyps, every 5 years     D & C  1989      FEMUR/KNEE SURG  "UNLISTED      Lt. knee dislocation     TONSILLECTOMY & ADENOIDECTOMY      age 14       Social History     Socioeconomic History     Marital status:      Spouse name: Not on file     Number of children: Not on file     Years of education: Not on file     Highest education level: Not on file   Social Needs     Financial resource strain: Not on file     Food insecurity - worry: Not on file     Food insecurity - inability: Not on file     Transportation needs - medical: Not on file     Transportation needs - non-medical: Not on file   Occupational History     Not on file   Tobacco Use     Smoking status: Never Smoker     Smokeless tobacco: Never Used   Substance and Sexual Activity     Alcohol use: No     Drug use: No     Sexual activity: No   Other Topics Concern     Parent/sibling w/ CABG, MI or angioplasty before 65F 55M? No   Social History Narrative     Not on file       Current Outpatient Medications   Medication Sig Dispense Refill     aspirin 81 MG tablet Take 1 tablet (81 mg) by mouth daily 30 tablet      IBUPROFEN PO        lisinopril (PRINIVIL/ZESTRIL) 10 MG tablet TAKE 1 TABLET (10 MG) BY MOUTH DAILY 90 tablet 4     metFORMIN (GLUCOPHAGE) 500 MG tablet Take 1 tablet (500 mg) by mouth 2 times daily (with meals) 180 tablet 4     MULTIVITAMIN TABS   OR 1 TABLET DAILY       rosuvastatin (CRESTOR) 40 MG tablet Take 1 tablet (40 mg) by mouth daily 90 tablet 4       Medications and history reviewed    Physical exam:  Vitals: /76   Pulse 82   Ht 1.626 m (5' 4\")   Wt 89.4 kg (197 lb)   BMI 33.81 kg/m     BMI= Body mass index is 33.81 kg/m .    Constitutional: healthy, alert and no distress  Head: Normocephalic. No masses, lesions, tenderness or abnormalities  Cardiovascular: negative, PMI normal. No lifts, heaves, or thrills. RRR. No murmurs, clicks gallops or rub  Respiratory: negative, Percussion normal. Good diaphragmatic excursion. Lungs clear  Gastrointestinal: Abdomen soft, non-tender. BS " normal. No masses, organomegaly, positive findings: umbilical hernia, small, reducible. Obese. No other masses or abnormalities felt in area  : Deferred  Musculoskeletal: extremities normal- no gross deformities noted, gait normal and normal muscle tone  Skin: no suspicious lesions or rashes  Psychiatric: mentation appears normal and affect normal/bright  Hematologic/Lymphatic/Immunologic: Normal cervical lymph nodes  Patient able to get up on table without difficulty.      Imaging shows: personally viewed  CT ABDOMEN AND PELVIS WITH CONTRAST   12/24/2018 9:59 AM      HISTORY: Abdominal pain.     TECHNIQUE: 98 mL Isovue-370 IV were administered. After contrast  administration, volumetric helical sections were acquired from the  lung bases to the ischial tuberosities. Coronal images were also  reconstructed. Radiation dose for this scan was reduced using  automated exposure control, adjustment of the mA and/or kV according  to patient size, or iterative reconstruction technique.     COMPARISON: None.      FINDINGS: No bowel obstruction. Sigmoid diverticulosis, without  convincing evidence for diverticulitis. Unremarkable appendix. No free  fluid in the pelvis. Mild atherosclerotic aortoiliac calcification. A  2.1 cm low-density lesion in the lateral segment of the left hepatic  lobe anteriorly may represent a hemangioma but is not definitively  characterized. Few small right renal cysts are noted. The liver,  spleen, gallbladder, adrenal glands, pancreas, and kidneys are  otherwise unremarkable. No hydronephrosis.  There is a small  fat-containing paraumbilical hernia. The visualized lung bases are  clear. Degenerative changes are noted in the lumbar spine.                                                                      IMPRESSION:   1. No acute abnormality in the abdomen or pelvis. No cause for  abdominal pain is identified.  2. Small fat-containing paraumbilical hernia is noted.    Assessment:     ICD-10-CM     1. Umbilical hernia without obstruction and without gangrene K42.9      Plan: Discussed CT findings and exam showing small umbilical hernia containing fat. This really is asymptomatic at this time as would be unrelated to her bowel function/diarrhea issues mentioned, and repair would not effect that. Discussed repair options if things change and this becomes symptomatic and to go to ER if acute pain or other incarceration/strangulation symptoms. No other questions at this time. Follow up as needed as no plans for repair at this time.    Emanuel Mosley MD      Again, thank you for allowing me to participate in the care of your patient.        Sincerely,        Emanuel Mosley MD

## 2019-06-11 NOTE — PROGRESS NOTES
Subjective     Livia Gill is a 64 year old female who presents to clinic today for the following health issues:    History of Present Illness     Diabetes:   She presents for follow up of diabetes.  She is not checking blood glucose. Blood sugar time of day: not checking blood sugars.  Blood glucose is never over 200 and never under 70. When her blood glucose is low, the patient is asymptomatic for confusion, blurred vision, lethargy and reports not feeling dizzy, shaky, or weak.  She has no concerns regarding her diabetes at this time.  She is not experiencing numbness or burning in feet, excessive thirst, blurry vision, weight changes or redness, sores or blisters on feet. The patient has had a diabetic eye exam in the last 12 months. Eye exam performed on 12/2018.    Diabetes Management Resources     Hyperlipidemia Follow-Up      Are you having any of the following symptoms? (Select all that apply)  No complaints of shortness of breath, chest pain or pressure.  No increased sweating or nausea with activity.  No left-sided neck or arm pain.  No complaints of pain in calves when walking 1-2 blocks.    Are you regularly taking any medication or supplement to lower your cholesterol?   Yes- rosuvastatin    Are you having muscle aches or other side effects that you think could be caused by your cholesterol lowering medication?  No      Hypertension Follow-up      Do you check your blood pressure regularly outside of the clinic? No     Are you following a low salt diet? Yes    Are your blood pressures ever more than 140 on the top number (systolic) OR more   than 90 on the bottom number (diastolic), for example 140/90? No          Patient Active Problem List   Diagnosis     Hyperlipidemia LDL goal <160     Cervical cancer screening     Type 2 diabetes mellitus without complication, without long-term current use of insulin (H)     Class 1 obesity due to excess calories with serious comorbidity and body mass index  "(BMI) of 33.0 to 33.9 in adult     Past Surgical History:   Procedure Laterality Date     C NONSPECIFIC PROCEDURE  1989    laparscopy for infertility     CATARACT IOL, RT/LT Right 1/2015    right cataract      COLONOSCOPY  00, 06, 12, 17    polyps, every 5 years     D & C  1989     HC FEMUR/KNEE SURG UNLISTED      Lt. knee dislocation     TONSILLECTOMY & ADENOIDECTOMY      age 14       Social History     Tobacco Use     Smoking status: Never Smoker     Smokeless tobacco: Never Used   Substance Use Topics     Alcohol use: No     Family History   Problem Relation Age of Onset     Diabetes Father      Other Cancer Sister 61        Ovarian Cancer         Current Outpatient Medications   Medication Sig Dispense Refill     aspirin 81 MG tablet Take 1 tablet (81 mg) by mouth daily 30 tablet      IBUPROFEN PO        lisinopril (PRINIVIL/ZESTRIL) 10 MG tablet TAKE 1 TABLET (10 MG) BY MOUTH DAILY 90 tablet 1     metFORMIN (GLUCOPHAGE) 500 MG tablet Take 1 tablet (500 mg) by mouth 2 times daily (with meals) 180 tablet 1     rosuvastatin (CRESTOR) 40 MG tablet Take 1 tablet (40 mg) by mouth daily 90 tablet 1     MULTIVITAMIN TABS   OR 1 TABLET DAILY       No Known Allergies  BP Readings from Last 3 Encounters:   06/14/19 98/60   01/07/19 119/76   12/13/18 100/60    Wt Readings from Last 3 Encounters:   06/14/19 91.8 kg (202 lb 6.4 oz)   01/07/19 89.4 kg (197 lb)   12/13/18 90.7 kg (200 lb)                      Reviewed and updated as needed this visit by Provider         Review of Systems   ROS COMP: Constitutional, HEENT, cardiovascular, pulmonary, gi and gu systems are negative, except as otherwise noted.      Objective    BP 98/60   Pulse 87   Temp 97.3  F (36.3  C) (Oral)   Resp 12   Ht 1.626 m (5' 4\")   Wt 91.8 kg (202 lb 6.4 oz)   SpO2 97%   BMI 34.74 kg/m    Body mass index is 34.74 kg/m .  Physical Exam   GENERAL: healthy, alert and no distress  RESP: lungs clear to auscultation - no rales, rhonchi or " wheezes  CV: regular rate and rhythm, normal S1 S2, no S3 or S4, no murmur, click or rub, no peripheral edema and peripheral pulses strong  MS: no gross musculoskeletal defects noted, no edema    Diagnostic Test Results:  Results for orders placed or performed in visit on 06/14/19 (from the past 24 hour(s))   HEMOGLOBIN A1C   Result Value Ref Range    Hemoglobin A1C 6.7 (H) 0 - 5.6 %           Assessment & Plan     1. Type 2 diabetes mellitus without complication, without long-term current use of insulin (H)  Stable.  Continue current treatment plan and medications.    - TSH WITH FREE T4 REFLEX  - HEMOGLOBIN A1C  - Basic metabolic panel  - Albumin Random Urine Quantitative with Creat Ratio  - metFORMIN (GLUCOPHAGE) 500 MG tablet; Take 1 tablet (500 mg) by mouth 2 times daily (with meals)  Dispense: 180 tablet; Refill: 1  - lisinopril (PRINIVIL/ZESTRIL) 10 MG tablet; TAKE 1 TABLET (10 MG) BY MOUTH DAILY  Dispense: 90 tablet; Refill: 1    2. Asymptomatic postmenopausal status    - DEXA HIP/PELVIS/SPINE - Future; Future    3. Hyperlipidemia LDL goal <160  Stable.  Continue current treatment plan and medications.    - rosuvastatin (CRESTOR) 40 MG tablet; Take 1 tablet (40 mg) by mouth daily  Dispense: 90 tablet; Refill: 1    4. Need for shingles vaccine    - ZOSTER VACCINE RECOMBINANT ADJUVANTED IM NJX             Return in about 6 months (around 12/14/2019) for Diabetic Check.    ANN Handy [facility]

## 2019-06-14 ENCOUNTER — OFFICE VISIT (OUTPATIENT)
Dept: FAMILY MEDICINE | Facility: CLINIC | Age: 64
End: 2019-06-14
Payer: COMMERCIAL

## 2019-06-14 VITALS
RESPIRATION RATE: 12 BRPM | HEIGHT: 64 IN | WEIGHT: 202.4 LBS | BODY MASS INDEX: 34.56 KG/M2 | OXYGEN SATURATION: 97 % | TEMPERATURE: 97.3 F | HEART RATE: 87 BPM | SYSTOLIC BLOOD PRESSURE: 98 MMHG | DIASTOLIC BLOOD PRESSURE: 60 MMHG

## 2019-06-14 DIAGNOSIS — Z23 NEED FOR SHINGLES VACCINE: ICD-10-CM

## 2019-06-14 DIAGNOSIS — E78.5 HYPERLIPIDEMIA LDL GOAL <160: ICD-10-CM

## 2019-06-14 DIAGNOSIS — E11.9 TYPE 2 DIABETES MELLITUS WITHOUT COMPLICATION, WITHOUT LONG-TERM CURRENT USE OF INSULIN (H): Primary | ICD-10-CM

## 2019-06-14 DIAGNOSIS — Z78.0 ASYMPTOMATIC POSTMENOPAUSAL STATUS: ICD-10-CM

## 2019-06-14 LAB
ANION GAP SERPL CALCULATED.3IONS-SCNC: 13 MMOL/L (ref 3–14)
BUN SERPL-MCNC: 15 MG/DL (ref 7–30)
CALCIUM SERPL-MCNC: 9.6 MG/DL (ref 8.5–10.1)
CHLORIDE SERPL-SCNC: 104 MMOL/L (ref 94–109)
CO2 SERPL-SCNC: 26 MMOL/L (ref 20–32)
CREAT SERPL-MCNC: 0.94 MG/DL (ref 0.52–1.04)
CREAT UR-MCNC: 158 MG/DL
GFR SERPL CREATININE-BSD FRML MDRD: 63 ML/MIN/{1.73_M2}
GLUCOSE SERPL-MCNC: 116 MG/DL (ref 70–99)
HBA1C MFR BLD: 6.7 % (ref 0–5.6)
MICROALBUMIN UR-MCNC: 13 MG/L
MICROALBUMIN/CREAT UR: 7.97 MG/G CR (ref 0–25)
POTASSIUM SERPL-SCNC: 4.2 MMOL/L (ref 3.4–5.3)
SODIUM SERPL-SCNC: 143 MMOL/L (ref 133–144)
TSH SERPL DL<=0.005 MIU/L-ACNC: 2.5 MU/L (ref 0.4–4)

## 2019-06-14 PROCEDURE — 82043 UR ALBUMIN QUANTITATIVE: CPT | Performed by: NURSE PRACTITIONER

## 2019-06-14 PROCEDURE — 90750 HZV VACC RECOMBINANT IM: CPT | Performed by: NURSE PRACTITIONER

## 2019-06-14 PROCEDURE — 84443 ASSAY THYROID STIM HORMONE: CPT | Performed by: NURSE PRACTITIONER

## 2019-06-14 PROCEDURE — 80048 BASIC METABOLIC PNL TOTAL CA: CPT | Performed by: NURSE PRACTITIONER

## 2019-06-14 PROCEDURE — 90471 IMMUNIZATION ADMIN: CPT | Performed by: NURSE PRACTITIONER

## 2019-06-14 PROCEDURE — 83036 HEMOGLOBIN GLYCOSYLATED A1C: CPT | Performed by: NURSE PRACTITIONER

## 2019-06-14 PROCEDURE — 36415 COLL VENOUS BLD VENIPUNCTURE: CPT | Performed by: NURSE PRACTITIONER

## 2019-06-14 PROCEDURE — 99214 OFFICE O/P EST MOD 30 MIN: CPT | Mod: 25 | Performed by: NURSE PRACTITIONER

## 2019-06-14 RX ORDER — ROSUVASTATIN CALCIUM 40 MG/1
40 TABLET, COATED ORAL DAILY
Qty: 90 TABLET | Refills: 1 | Status: SHIPPED | OUTPATIENT
Start: 2019-06-14 | End: 2020-03-11

## 2019-06-14 RX ORDER — LISINOPRIL 10 MG/1
TABLET ORAL
Qty: 90 TABLET | Refills: 1 | Status: SHIPPED | OUTPATIENT
Start: 2019-06-14 | End: 2020-03-03

## 2019-06-14 ASSESSMENT — MIFFLIN-ST. JEOR: SCORE: 1443.08

## 2019-06-14 ASSESSMENT — PAIN SCALES - GENERAL: PAINLEVEL: NO PAIN (0)

## 2019-06-14 NOTE — NURSING NOTE
Prior to injection, verified patient identity using patient's name and date of birth.  Due to injection administration, patient instructed to remain in clinic for 15 minutes  afterwards, and to report any adverse reaction to me immediately.    shingrix    Drug Amount Wasted:  None.  Vial/Syringe: Single dose vial  Expiration Date:  09/13/2021

## 2019-06-17 NOTE — RESULT ENCOUNTER NOTE
Dear Livia,    Your recent test results are attached.      Normal thyroid.   No excess protein in the urine.  Normal kidney function and electrolytes.      If you have any questions please feel free to contact [phone] or myself via [email or web address].    Sincerely,  Lissett Conklin, CNP

## 2019-07-03 ENCOUNTER — ANCILLARY PROCEDURE (OUTPATIENT)
Dept: BONE DENSITY | Facility: CLINIC | Age: 64
End: 2019-07-03
Attending: NURSE PRACTITIONER
Payer: COMMERCIAL

## 2019-07-03 DIAGNOSIS — Z78.0 ASYMPTOMATIC POSTMENOPAUSAL STATUS: ICD-10-CM

## 2019-07-03 PROCEDURE — 77080 DXA BONE DENSITY AXIAL: CPT | Performed by: INTERNAL MEDICINE

## 2019-07-05 NOTE — RESULT ENCOUNTER NOTE
Dear Livia,    Your recent test results are attached.      Normal bone density.    If you have any questions please feel free to contact [phone] or myself via [email or web address].    Sincerely,  Lissett Conklin, CNP

## 2019-07-16 ENCOUNTER — TELEPHONE (OUTPATIENT)
Dept: ORTHOPEDICS | Facility: CLINIC | Age: 64
End: 2019-07-16

## 2019-07-16 NOTE — TELEPHONE ENCOUNTER
Called and spoke with patient.  Left foot and lower leg is swollen.  No recent travel,  No increased sitting, no history of DVT.  Swelling began last night.   Denies any trouble breathing, no signs of illness.    Discussed that to truly evaluate for a DVT, she would need an US, which is something we could order if she wanted to be seen today.  She states she has an appointment tomorrow with her primary care.  Discussed signs that would require her to be seen urgently; increase in pain, swelling, redness, trouble breathing, fever, night sweats, etc that she should be seen in the ER.  She agreed.  Will keep her primary care appointment for tomorrow morning.  Gale Escobedo MS ATC

## 2019-07-16 NOTE — TELEPHONE ENCOUNTER
Cipriano ROWE stating she is having Left leg & foot swelling and stiffness, no pain. She is concerned that she has a blood clot and is wondering if she can be seen today. Would like a call back.

## 2019-07-17 ENCOUNTER — OFFICE VISIT (OUTPATIENT)
Dept: FAMILY MEDICINE | Facility: CLINIC | Age: 64
End: 2019-07-17
Payer: COMMERCIAL

## 2019-07-17 VITALS
SYSTOLIC BLOOD PRESSURE: 133 MMHG | WEIGHT: 202 LBS | HEIGHT: 64 IN | TEMPERATURE: 97.8 F | DIASTOLIC BLOOD PRESSURE: 73 MMHG | HEART RATE: 67 BPM | BODY MASS INDEX: 34.49 KG/M2

## 2019-07-17 DIAGNOSIS — M79.89 LEFT LEG SWELLING: ICD-10-CM

## 2019-07-17 DIAGNOSIS — M17.12 PRIMARY OSTEOARTHRITIS OF LEFT KNEE: Primary | ICD-10-CM

## 2019-07-17 PROCEDURE — 99214 OFFICE O/P EST MOD 30 MIN: CPT | Performed by: FAMILY MEDICINE

## 2019-07-17 ASSESSMENT — MIFFLIN-ST. JEOR: SCORE: 1447.14

## 2019-07-17 ASSESSMENT — PAIN SCALES - GENERAL: PAINLEVEL: SEVERE PAIN (7)

## 2019-07-17 NOTE — PROGRESS NOTES
Subjective     Livia Gill is a 64 year old female who presents to clinic today for the following health issues:  Patient comes in today with left knee pain, swelling and left lower leg.  She reports the swelling does get better in the morning she denies any calf pain she has no redness.  No history of travel.  No previous history of blood clot.  She reports the pain in her left knee is been on and off for many years.  Previously, patient had an x-ray to her knee May, 2016 which showed she has severe medial compartment narrowing of the left knee, with lateral subluxation of the tibia.    Patient denies any recent injury or trauma she reports the pain does get better when she take ibuprofen.  She reports she was wearing a knee brace, was tight and that cause more swelling in her calf and her foot.  She reports sometimes she feels pressure behind her kneecap.    Joint Pain    Onset: On and off for awhile    Description:   Location: left knee  Character: Dull ache    Intensity: severe, 7/10    Progression of Symptoms: worse, intermittent    Accompanying Signs & Symptoms:  Other symptoms: radiation of pain to ankle, tingling and swelling    History:   Previous similar pain: YES      Precipitating factors:   Trauma or overuse: YES- childhood surgery in left knee    Alleviating factors:  Improved by: nothing    Therapies Tried and outcome: Ibuprofen several times a day; loosened things up     Patient Active Problem List   Diagnosis     Hyperlipidemia LDL goal <160     Cervical cancer screening     Type 2 diabetes mellitus without complication, without long-term current use of insulin (H)     Class 1 obesity due to excess calories with serious comorbidity and body mass index (BMI) of 33.0 to 33.9 in adult     Past Surgical History:   Procedure Laterality Date     C NONSPECIFIC PROCEDURE  1989    laparscopy for infertility     CATARACT IOL, RT/LT Right 1/2015    right cataract      COLONOSCOPY  00, 06, 12, 17    polyps,  "every 5 years     D & C  1989     HC FEMUR/KNEE SURG UNLISTED      Lt. knee dislocation     TONSILLECTOMY & ADENOIDECTOMY      age 14       Social History     Tobacco Use     Smoking status: Never Smoker     Smokeless tobacco: Never Used   Substance Use Topics     Alcohol use: No     Family History   Problem Relation Age of Onset     Diabetes Father      Other Cancer Sister 61        Ovarian Cancer         Current Outpatient Medications   Medication Sig Dispense Refill     aspirin 81 MG tablet Take 1 tablet (81 mg) by mouth daily 30 tablet      IBUPROFEN PO        lisinopril (PRINIVIL/ZESTRIL) 10 MG tablet TAKE 1 TABLET (10 MG) BY MOUTH DAILY 90 tablet 1     metFORMIN (GLUCOPHAGE) 500 MG tablet Take 1 tablet (500 mg) by mouth 2 times daily (with meals) 180 tablet 1     order for DME Equipment being ordered: Left Knee Brace  Left knee Osteoarthritis, Left Knee pain. 1 Units 0     rosuvastatin (CRESTOR) 40 MG tablet Take 1 tablet (40 mg) by mouth daily 90 tablet 1     MULTIVITAMIN TABS   OR 1 TABLET DAILY       No Known Allergies    Reviewed and updated as needed this visit by Provider         Review of Systems   ROS COMP: Constitutional, HEENT, cardiovascular, pulmonary, gi and gu systems are negative, except as otherwise noted.      Objective    /73 (BP Location: Left arm, Patient Position: Chair, Cuff Size: Adult Large)   Pulse 67   Temp 97.8  F (36.6  C) (Oral)   Ht 1.635 m (5' 4.37\")   Wt 91.6 kg (202 lb)   Breastfeeding? No   BMI 34.28 kg/m    Body mass index is 34.28 kg/m .  Physical Exam   GENERAL APPEARANCE: healthy, alert and no distress  ORTHO: Knee Exam: Inspection: AP/lateral alignment normal  Tender: medial joint line  Non-tender: rest of knee  Active Range of Motion: decreased flexion , pain with flexion  Strength: decreased  NO Calf tenderness  Trace pedal edema, foot and lower leg  Skin normal     PSYCH: mentation appears normal and affect normal/bright    Diagnostic Test Results:  Labs " reviewed in Epic  none         Assessment & Plan       ICD-10-CM    1. Primary osteoarthritis of left knee M17.12 order for DME   2. Left leg swelling M79.89      Patient symptoms likely osteoarthritis of the knees.  The swelling in her legs, foot likely secondary to leakage of a  Baker's cyst.  She does have swelling and pressure behind her kneecap as well.    I did discuss with the patient elevation, icing her knee at home exercise.  She was fitted with a knee brace.    Discussed with her treatment option which include oral NSAID.  She has been taking ibuprofen that seems to help.  In addition, discussed topical diclofenac, and cortisone injection.  And finally possibly she may be considered for total knee arthroplasty.  And therefore going orthopedic.    For now patient will continue with the knee brace, continue with home exercises and continue with ibuprofen.      See Patient Instructions    No follow-ups on file.    Rand Ricketts MD  [facility]

## 2019-07-17 NOTE — PATIENT INSTRUCTIONS
Patient Education     Quad Set for Leg and Knee    This exercise is designed to stretch and strengthen your knee. Before beginning, read through all the instructions. While exercising, breathe normally and use smooth movements. If you feel any pain, stop the exercise. If pain continues, call your healthcare provider.  1. Sit on the floor with one leg straight, the other bent.  2. Flex the foot of your straight leg by pointing your toes toward you. Press the back of your knee into the floor while tightening the muscle on the top of your thigh. Hold for 3 to 5 seconds. Then relax.  3. Repeat 10 to 15 times. Do 3 to 5 sets a day.  Caution    Don t arch your back.    Don t hunch your shoulders.  Date Last Reviewed: 2/1/2018 2000-2018 [employer]. [address] [place] [address]. All rights reserved. This information is not intended as a substitute for professional medical care. Always follow your healthcare professional's instructions.           Patient Education     Leg and Knee Exercises: Leg Lunge     This exercise is designed to stretch and strengthen your knee. Before beginning, read through all the instructions. Start with a warm-up, which can help prevent muscle soreness and improve coordination so you do not fall. While exercising, breathe normally and use smooth movements. If you feel any pain, stop the exercise. If pain persists, call your healthcare provider.  4. After a brief warm-up, such as brisk walking for a few minutes, stand with your feet shoulder-width apart.  5. With one foot, step out and lower yourself into a comfortable position. Keep your back straight and your feet pointing straight ahead. As you step, the heel of the other foot lifts off the floor.  6. Return smoothly to your starting position.  7. Repeat ______ times on each side. Do ______ sets a day.  Caution    Don t let your forward knee go past your toes.    Don t lunge so far that your rear knee touches the  floor.    Keep knees in line with the second toe.   Date Last Reviewed: 12/1/2017 2000-2018 The [employer]. [address] [place] [address], [place] [address]. All rights reserved. This information is not intended as a substitute for professional medical care. Always follow your healthcare professional's instructions.           Patient Education     Exercise After Knee Replacement Surgery  Strong, flexible muscles help protect your knee. Improve your strength and increase your range of motion by doing the exercises shown here. Talk with your healthcare provider if doing your exercises causes new or lasting pain.    ? Build muscle strength  Strong thigh muscles reduce the amount of force placed on your knee. This helps the joint last longer.  Quad set    Sit against the head of a bed. Place the leg with the new joint straight out in front of you.    Tighten only the front thigh muscles. Then press the back of your leg toward the ground.    Hold for a count of 5. Repeat as directed.    ? Improve joint motion  Range-of-motion exercises help your new knee bend more smoothly. Practice flexing and extending your knee as you were taught.  Sitting knee bends    Sit in a chair with a towel under the new knee joint.    Using your leg muscles, straighten your leg. Hold for a count of 5.    Then bend your leg back as far as you can. Hold for a count of 5. Repeat as directed.  ? Walk to stay in shape    Take a few short walks each day. Increase your walking time as you heal. This is one of the best ways to help your knee recover. Use a cane or walker if needed.    If you feel more pain than usual after an activity, you may have overdone it. Take it a little easier for a few hours. If the pain doesn't resolve, contact your healthcare provider.  It is important that your healthcare provider is aware of and supports any exercise routine after knee replacement surgery.  Date Last Reviewed: 3/1/2018    [id number] [employer] [place]. [address] [place] [address], [place] [address]. All rights reserved. This information is not intended as a substitute for professional medical care. Always follow your healthcare professional's instructions.

## 2020-02-23 ENCOUNTER — HEALTH MAINTENANCE LETTER (OUTPATIENT)
Age: 65
End: 2020-02-23

## 2020-03-01 DIAGNOSIS — E11.9 TYPE 2 DIABETES MELLITUS WITHOUT COMPLICATION, WITHOUT LONG-TERM CURRENT USE OF INSULIN (H): ICD-10-CM

## 2020-03-02 NOTE — TELEPHONE ENCOUNTER
Disp Refills Start End ERASMO   lisinopril (PRINIVIL/ZESTRIL) 10 MG tablet 90 tablet 1 6/14/2019  No   Sig: TAKE 1 TABLET (10 MG) BY MOUTH DAILY   Sent to pharmacy as: lisinopril (PRINIVIL/ZESTRIL) 10 MG tablet   Class: E-Prescribe   Notes to Pharmacy: Please do not fill until patient requests.   Order: [id number]   E-Prescribing Status: Receipt confirmed by pharmacy (6/14/2019  3:33 PM CDT)     Narcisa Robledo MA on 3/2/2020 at 8:21 AM

## 2020-03-03 RX ORDER — LISINOPRIL 10 MG/1
TABLET ORAL
Qty: 90 TABLET | Refills: 0 | Status: SHIPPED | OUTPATIENT
Start: 2020-03-03 | End: 2020-03-11

## 2020-03-11 ENCOUNTER — OFFICE VISIT (OUTPATIENT)
Dept: FAMILY MEDICINE | Facility: CLINIC | Age: 65
End: 2020-03-11
Payer: COMMERCIAL

## 2020-03-11 VITALS
BODY MASS INDEX: 34.45 KG/M2 | RESPIRATION RATE: 14 BRPM | DIASTOLIC BLOOD PRESSURE: 74 MMHG | OXYGEN SATURATION: 96 % | HEIGHT: 64 IN | WEIGHT: 201.8 LBS | TEMPERATURE: 97.1 F | HEART RATE: 82 BPM | SYSTOLIC BLOOD PRESSURE: 136 MMHG

## 2020-03-11 DIAGNOSIS — E11.9 TYPE 2 DIABETES MELLITUS WITHOUT COMPLICATION, WITHOUT LONG-TERM CURRENT USE OF INSULIN (H): ICD-10-CM

## 2020-03-11 DIAGNOSIS — Z12.31 ENCOUNTER FOR SCREENING MAMMOGRAM FOR BREAST CANCER: Primary | ICD-10-CM

## 2020-03-11 DIAGNOSIS — L29.9 LOCALIZED PRURITUS: ICD-10-CM

## 2020-03-11 DIAGNOSIS — E78.5 HYPERLIPIDEMIA LDL GOAL <160: ICD-10-CM

## 2020-03-11 LAB
CHOLEST SERPL-MCNC: 89 MG/DL
HBA1C MFR BLD: 7.2 % (ref 0–5.6)
HDLC SERPL-MCNC: 38 MG/DL
LDLC SERPL CALC-MCNC: 17 MG/DL
NONHDLC SERPL-MCNC: 51 MG/DL
TRIGL SERPL-MCNC: 168 MG/DL

## 2020-03-11 PROCEDURE — 80061 LIPID PANEL: CPT | Performed by: NURSE PRACTITIONER

## 2020-03-11 PROCEDURE — 99214 OFFICE O/P EST MOD 30 MIN: CPT | Mod: 25 | Performed by: NURSE PRACTITIONER

## 2020-03-11 PROCEDURE — 90662 IIV NO PRSV INCREASED AG IM: CPT | Performed by: NURSE PRACTITIONER

## 2020-03-11 PROCEDURE — 83036 HEMOGLOBIN GLYCOSYLATED A1C: CPT | Performed by: NURSE PRACTITIONER

## 2020-03-11 PROCEDURE — 36415 COLL VENOUS BLD VENIPUNCTURE: CPT | Performed by: NURSE PRACTITIONER

## 2020-03-11 PROCEDURE — 90471 IMMUNIZATION ADMIN: CPT | Performed by: NURSE PRACTITIONER

## 2020-03-11 RX ORDER — ROSUVASTATIN CALCIUM 40 MG/1
40 TABLET, COATED ORAL DAILY
Qty: 90 TABLET | Refills: 1 | Status: SHIPPED | OUTPATIENT
Start: 2020-03-11 | End: 2020-09-30

## 2020-03-11 RX ORDER — LISINOPRIL 10 MG/1
TABLET ORAL
Qty: 90 TABLET | Refills: 1 | Status: SHIPPED | OUTPATIENT
Start: 2020-03-11 | End: 2020-10-13

## 2020-03-11 ASSESSMENT — MIFFLIN-ST. JEOR: SCORE: 1444.98

## 2020-03-11 ASSESSMENT — PAIN SCALES - GENERAL: PAINLEVEL: NO PAIN (0)

## 2020-03-11 NOTE — PROGRESS NOTES
Subjective     Livia Gill is a 64 year old female who presents to clinic today for the following health issues:    HPI   Diabetes Follow-up      How often are you checking your blood sugar? Not at all    What concerns do you have today about your diabetes? None     Do you have any of these symptoms? (Select all that apply)  No numbness or tingling in feet.  No redness, sores or blisters on feet.  No complaints of excessive thirst.  No reports of blurry vision.  No significant changes to weight.    Have you had a diabetic eye exam in the last 12 months? No        BP Readings from Last 2 Encounters:   03/11/20 136/74   07/17/19 133/73     Hemoglobin A1C (%)   Date Value   03/11/2020 7.2 (H)   06/14/2019 6.7 (H)     LDL Cholesterol Calculated (mg/dL)   Date Value   10/05/2018 15   01/15/2018 11               Hyperlipidemia Follow-Up      Are you regularly taking any medication or supplement to lower your cholesterol?   Yes- Crestor    Are you having muscle aches or other side effects that you think could be caused by your cholesterol lowering medication?  No      How many servings of fruits and vegetables do you eat daily?  2-3    On average, how many sweetened beverages do you drink each day (Examples: soda, juice, sweet tea, etc.  Do NOT count diet or artificially sweetened beverages)?   0-1    How many days per week do you exercise enough to make your heart beat faster? Just started [employer] a week ago.     How many minutes a day do you exercise enough to make your heart beat faster? Will be doing water aerobics twice per week for one hour.   How many days per week do you miss taking your medication? 1    What makes it hard for you to take your medications?  remembering to take    Patient notes itching to her left breast.  She has applied lotion and it helps, but will still have occasional itching.  She denies pain, masses, or nipple discharge.        Patient Active Problem List   Diagnosis     Hyperlipidemia  LDL goal <160     Cervical cancer screening     Type 2 diabetes mellitus without complication, without long-term current use of insulin (H)     Class 1 obesity due to excess calories with serious comorbidity and body mass index (BMI) of 33.0 to 33.9 in adult     Past Surgical History:   Procedure Laterality Date     C NONSPECIFIC PROCEDURE  1989    laparscopy for infertility     CATARACT IOL, RT/LT Right 1/2015    right cataract      COLONOSCOPY  00, 06, 12, 17    polyps, every 5 years     D & C  1989     HC FEMUR/KNEE SURG UNLISTED      Lt. knee dislocation     TONSILLECTOMY & ADENOIDECTOMY      age 14       Social History     Tobacco Use     Smoking status: Never Smoker     Smokeless tobacco: Never Used   Substance Use Topics     Alcohol use: No     Family History   Problem Relation Age of Onset     Diabetes Father      Other Cancer Sister 61        Ovarian Cancer         Current Outpatient Medications   Medication Sig Dispense Refill     aspirin 81 MG tablet Take 1 tablet (81 mg) by mouth daily 30 tablet      IBUPROFEN PO        lisinopril (ZESTRIL) 10 MG tablet TAKE 1 TABLET BY MOUTH EVERY DAY 90 tablet 1     metFORMIN (GLUCOPHAGE) 500 MG tablet Take 1 tablet (500 mg) by mouth 2 times daily (with meals) 180 tablet 1     Multiple Vitamins-Minerals (DRY EYE FORMULA PO) Omega 3       rosuvastatin (CRESTOR) 40 MG tablet Take 1 tablet (40 mg) by mouth daily 90 tablet 1     MULTIVITAMIN TABS   OR 1 TABLET DAILY       order for DME Equipment being ordered: Left Knee Brace  Left knee Osteoarthritis, Left Knee pain. (Patient not taking: Reported on 3/11/2020) 1 Units 0     No Known Allergies  BP Readings from Last 3 Encounters:   03/11/20 136/74   07/17/19 133/73   06/14/19 98/60    Wt Readings from Last 3 Encounters:   03/11/20 91.5 kg (201 lb 12.8 oz)   07/17/19 91.6 kg (202 lb)   06/14/19 91.8 kg (202 lb 6.4 oz)                      Reviewed and updated as needed this visit by Provider  Tobacco  Allergies  Meds   "Problems  Med Hx  Surg Hx  Fam Hx         Review of Systems   ROS COMP: Constitutional, HEENT, cardiovascular, pulmonary, gi and gu systems are negative, except as otherwise noted.      Objective    /74   Pulse 82   Temp 97.1  F (36.2  C) (Oral)   Resp 14   Ht 1.633 m (5' 4.29\")   Wt 91.5 kg (201 lb 12.8 oz)   SpO2 96%   BMI 34.33 kg/m    Body mass index is 34.33 kg/m .  Physical Exam   GENERAL: healthy, alert and no distress  RESP: lungs clear to auscultation - no rales, rhonchi or wheezes  BREAST: normal without masses, tenderness or nipple discharge and no palpable axillary masses or adenopathy  CV: regular rate and rhythm, normal S1 S2, no S3 or S4, no murmur, click or rub, no peripheral edema and peripheral pulses strong  MS: no gross musculoskeletal defects noted, no edema  Diabetic foot exam: normal DP and PT pulses, no trophic changes or ulcerative lesions and normal sensory exam    Diagnostic Test Results:  Results for orders placed or performed in visit on 03/11/20 (from the past 24 hour(s))   HEMOGLOBIN A1C   Result Value Ref Range    Hemoglobin A1C 7.2 (H) 0 - 5.6 %           Assessment & Plan     1. Hyperlipidemia LDL goal <160  Stable.  Continue current treatment plan and medications.   - Lipid panel reflex to direct LDL Fasting  - rosuvastatin (CRESTOR) 40 MG tablet; Take 1 tablet (40 mg) by mouth daily  Dispense: 90 tablet; Refill: 1    2. Type 2 diabetes mellitus without complication, without long-term current use of insulin (H)  Patient is starting a diet and exercise program.  Repeat HgbA1C in 3 months and hold on med changes at this time.  - HEMOGLOBIN A1C  - metFORMIN (GLUCOPHAGE) 500 MG tablet; Take 1 tablet (500 mg) by mouth 2 times daily (with meals)  Dispense: 180 tablet; Refill: 1  - **A1C FUTURE 3mo; Future  - lisinopril (ZESTRIL) 10 MG tablet; TAKE 1 TABLET BY MOUTH EVERY DAY  Dispense: 90 tablet; Refill: 1    3. Encounter for screening mammogram for breast cancer    - " "*MA Screening Digital Bilateral; Future    4. Localized pruritus  No abnormalities noted on exam.  Mammogram as above.       BMI:   Estimated body mass index is 34.33 kg/m  as calculated from the following:    Height as of this encounter: 1.633 m (5' 4.29\").    Weight as of this encounter: 91.5 kg (201 lb 12.8 oz).   Weight management plan: Discussed healthy diet and exercise guidelines            Return in about 6 months (around 9/11/2020) for Diabetic Check.    ANN [facility]y [facility]        "

## 2020-03-12 NOTE — RESULT ENCOUNTER NOTE
Dear Livia,    Your recent test results are attached.      Good cholesterol.    If you have any questions please feel free to contact [phone] or myself via [employer].    Sincerely,  Lissett Conklin, CNP

## 2020-06-05 DIAGNOSIS — E11.9 TYPE 2 DIABETES MELLITUS WITHOUT COMPLICATION, WITHOUT LONG-TERM CURRENT USE OF INSULIN (H): ICD-10-CM

## 2020-06-05 LAB
CAPILLARY BLOOD COLLECTION: NORMAL
HBA1C MFR BLD: 7.1 % (ref 0–5.6)

## 2020-06-05 PROCEDURE — 83036 HEMOGLOBIN GLYCOSYLATED A1C: CPT | Performed by: NURSE PRACTITIONER

## 2020-06-05 PROCEDURE — 36416 COLLJ CAPILLARY BLOOD SPEC: CPT | Performed by: NURSE PRACTITIONER

## 2020-06-05 NOTE — RESULT ENCOUNTER NOTE
Dear Livia,    Your recent test results are attached.      Improvement in 3 month blood sugar average.  Please continue to work on diet and exercise and we'll hold on medication changes at this time.  Please follow-up in clinic in 3 months.    If you have any questions please feel free to contact [phone] or myself via [employer].    Sincerely,  Lissett Conklin, CNP

## 2020-08-01 ENCOUNTER — TRANSFERRED RECORDS (OUTPATIENT)
Dept: MULTI SPECIALTY CLINIC | Facility: CLINIC | Age: 65
End: 2020-08-01

## 2020-08-01 LAB — RETINOPATHY: NORMAL

## 2020-08-13 ENCOUNTER — TELEPHONE (OUTPATIENT)
Dept: FAMILY MEDICINE | Facility: CLINIC | Age: 65
End: 2020-08-13

## 2020-08-13 NOTE — TELEPHONE ENCOUNTER
Please abstract the following data from this visit with this patient into the appropriate field in Epic:    Tests that can be patient reported without a hard copy:      Other Tests found in the patient's chart through Chart Review/Care Everywhere:    Eye exam with ophthalmology on this date: 10/29/18- negative for retinopathy    Note to [employer]: If this section is blank, no results were found via Chart Review/Care Everywhere.

## 2020-09-28 DIAGNOSIS — E78.5 HYPERLIPIDEMIA LDL GOAL <160: ICD-10-CM

## 2020-09-28 DIAGNOSIS — E11.9 TYPE 2 DIABETES MELLITUS WITHOUT COMPLICATION, WITHOUT LONG-TERM CURRENT USE OF INSULIN (H): ICD-10-CM

## 2020-09-30 RX ORDER — ROSUVASTATIN CALCIUM 40 MG/1
40 TABLET, COATED ORAL DAILY
Qty: 90 TABLET | Refills: 1 | Status: SHIPPED | OUTPATIENT
Start: 2020-09-30 | End: 2021-03-26

## 2020-09-30 NOTE — TELEPHONE ENCOUNTER
"Routing refill request to provider for review/approval because:  Requested Prescriptions   Pending Prescriptions Disp Refills    metFORMIN (GLUCOPHAGE) 500 MG tablet 180 tablet 1     Sig: Take 1 tablet (500 mg) by mouth 2 times daily (with meals)       Biguanide Agents Failed - 9/28/2020  3:29 PM        Failed - Patient's CR is NOT>1.4 OR Patient's EGFR is NOT<45 within past 12 mos.     Recent Labs   Lab Test 06/14/19  1500 12/24/18   GFR  --  67   GFRB  --  63   GFRESTIMATED 63  --    GFRESTBLACK 73  --        Recent Labs   Lab Test 06/14/19  1500 12/24/18   CR 0.94  --    CRPOC  --  0.9             Passed - Patient is age 10 or older        Passed - Patient has documented A1c within the specified period of time.     If HgbA1C is 8 or greater, it needs to be on file within the past 3 months.  If less than 8, must be on file within the past 6 months.     Recent Labs   Lab Test 06/05/20  0953   A1C 7.1*             Passed - Patient does NOT have a diagnosis of CHF.        Passed - Medication is active on med list        Passed - Patient is not pregnant        Passed - Patient has not had a positive pregnancy test within the past 12 mos.         Passed - Recent (6 mo) or future (30 days) visit within the authorizing provider's specialty     Patient had office visit in the last 6 months or has a visit in the next 30 days with authorizing provider or within the authorizing provider's specialty.  See \"Patient Info\" tab in inbasket, or \"Choose Columns\" in Meds & Orders section of the refill encounter.             Signed Prescriptions Disp Refills    rosuvastatin (CRESTOR) 40 MG tablet 90 tablet 1     Sig: Take 1 tablet (40 mg) by mouth daily       Statins Protocol Passed - 9/28/2020  3:29 PM        Passed - LDL on file in past 12 months     Recent Labs   Lab Test 03/11/20  0944   LDL 17             Passed - No abnormal creatine kinase in past 12 months     No lab results found.             Passed - Recent (12 mo) or " "future (30 days) visit within the authorizing provider's specialty     Patient has had an office visit with the authorizing provider or a provider within the authorizing providers department within the previous 12 mos or has a future within next 30 days. See \"Patient Info\" tab in inbasket, or \"Choose Columns\" in Meds & Orders section of the refill encounter.              Passed - Medication is active on med list        Passed - Patient is age 18 or older        Passed - No active pregnancy on record        Passed - No positive pregnancy test in past 12 months               Romy HARMONN, RN, CPN          "

## 2020-10-13 ENCOUNTER — OFFICE VISIT (OUTPATIENT)
Dept: FAMILY MEDICINE | Facility: CLINIC | Age: 65
End: 2020-10-13
Payer: COMMERCIAL

## 2020-10-13 VITALS
OXYGEN SATURATION: 96 % | BODY MASS INDEX: 34.02 KG/M2 | DIASTOLIC BLOOD PRESSURE: 72 MMHG | HEART RATE: 92 BPM | SYSTOLIC BLOOD PRESSURE: 118 MMHG | TEMPERATURE: 98.1 F | WEIGHT: 200 LBS

## 2020-10-13 DIAGNOSIS — K58.0 IRRITABLE BOWEL SYNDROME WITH DIARRHEA: ICD-10-CM

## 2020-10-13 DIAGNOSIS — E11.9 TYPE 2 DIABETES MELLITUS WITHOUT COMPLICATION, WITHOUT LONG-TERM CURRENT USE OF INSULIN (H): Primary | ICD-10-CM

## 2020-10-13 DIAGNOSIS — Z23 NEED FOR PROPHYLACTIC VACCINATION AND INOCULATION AGAINST INFLUENZA: ICD-10-CM

## 2020-10-13 LAB — HBA1C MFR BLD: 7 % (ref 0–5.6)

## 2020-10-13 PROCEDURE — 83036 HEMOGLOBIN GLYCOSYLATED A1C: CPT | Performed by: NURSE PRACTITIONER

## 2020-10-13 PROCEDURE — 80048 BASIC METABOLIC PNL TOTAL CA: CPT | Performed by: NURSE PRACTITIONER

## 2020-10-13 PROCEDURE — 36415 COLL VENOUS BLD VENIPUNCTURE: CPT | Performed by: NURSE PRACTITIONER

## 2020-10-13 PROCEDURE — 90662 IIV NO PRSV INCREASED AG IM: CPT | Performed by: NURSE PRACTITIONER

## 2020-10-13 PROCEDURE — 99214 OFFICE O/P EST MOD 30 MIN: CPT | Mod: 25 | Performed by: NURSE PRACTITIONER

## 2020-10-13 PROCEDURE — 82043 UR ALBUMIN QUANTITATIVE: CPT | Performed by: NURSE PRACTITIONER

## 2020-10-13 PROCEDURE — 90471 IMMUNIZATION ADMIN: CPT | Performed by: NURSE PRACTITIONER

## 2020-10-13 RX ORDER — CHOLECALCIFEROL (VITAMIN D3) 25 MCG
3000 CAPSULE ORAL DAILY
COMMUNITY
Start: 2020-10-13

## 2020-10-13 RX ORDER — LISINOPRIL 10 MG/1
TABLET ORAL
Qty: 90 TABLET | Refills: 1 | Status: SHIPPED | OUTPATIENT
Start: 2020-10-13 | End: 2021-05-27

## 2020-10-13 ASSESSMENT — PAIN SCALES - GENERAL: PAINLEVEL: NO PAIN (0)

## 2020-10-13 NOTE — PROGRESS NOTES
Subjective     Livia Gill is a 65 year old female who presents to clinic today for the following health issues:    HPI         Diabetes Follow-up    How often are you checking your blood sugar? A few times a month  What time of day are you checking your blood sugars (select all that apply)?  Before and after meals  Have you had any blood sugars above 200?  No  Have you had any blood sugars below 70?  No    What symptoms do you notice when your blood sugar is low?  None    What concerns do you have today about your diabetes? None     Do you have any of these symptoms? (Select all that apply)  Blurry vision    Have you had a diabetic eye exam in the last 12 months? No        BP Readings from Last 2 Encounters:   10/13/20 118/72   03/11/20 136/74     Hemoglobin A1C (%)   Date Value   10/13/2020 7.0 (H)   06/05/2020 7.1 (H)     LDL Cholesterol Calculated (mg/dL)   Date Value   03/11/2020 17   10/05/2018 15                 How many servings of fruits and vegetables do you eat daily?  0-1    On average, how many sweetened beverages do you drink each day (Examples: soda, juice, sweet tea, etc.  Do NOT count diet or artificially sweetened beverages)?   0-1    How many days per week do you exercise enough to make your heart beat faster? none    How many minutes a day do you exercise enough to make your heart beat faster? none    How many days per week do you miss taking your medication? 0-1    Patient notes chronic intermittent diarrhea after eating certain foods.  She has had normal colonoscopy since start of symptoms.  Symptoms occur weekly, but not daily.    Review of Systems   Constitutional, HEENT, cardiovascular, pulmonary, gi and gu systems are negative, except as otherwise noted.      Objective    /72   Pulse (P) 92   Temp (P) 98.1  F (36.7  C) (Oral)   Wt (P) 90.7 kg (200 lb)   SpO2 (P) 96%   BMI (P) 34.02 kg/m    Body mass index is 34.02 kg/m  (pended).  Physical Exam   GENERAL: healthy, alert and no  distress  RESP: lungs clear to auscultation - no rales, rhonchi or wheezes  CV: regular rate and rhythm, normal S1 S2, no S3 or S4, no murmur, click or rub, no peripheral edema and peripheral pulses strong  MS: no gross musculoskeletal defects noted, no edema    Results for orders placed or performed in visit on 10/13/20 (from the past 24 hour(s))   **A1C FUTURE anytime   Result Value Ref Range    Hemoglobin A1C 7.0 (H) 0 - 5.6 %           Assessment & Plan     Type 2 diabetes mellitus without complication, without long-term current use of insulin (H)  Stable.  Continue current treatment plan and medications.   - BASIC METABOLIC PANEL  - Albumin Random Urine Quantitative with Creat Ratio  - **A1C FUTURE anytime; Future  - **A1C FUTURE anytime  - lisinopril (ZESTRIL) 10 MG tablet; TAKE 1 TABLET BY MOUTH EVERY DAY  - metFORMIN (GLUCOPHAGE) 500 MG tablet; Take 1 tablet (500 mg) by mouth 2 times daily (with meals)    Irritable bowel syndrome with diarrhea  Patient likely has IBS.  She will look at that FODMAP diet and try eliminating some foods.    Need for prophylactic vaccination and inoculation against influenza    - FLUZONE HIGH DOSE 65+  [83287]  - Vaccine Administration, Initial [95318]           Return in about 6 months (around 4/13/2021) for Diabetic Check.    ANN Handy CNP  [facility]

## 2020-10-14 LAB
ANION GAP SERPL CALCULATED.3IONS-SCNC: 3 MMOL/L (ref 3–14)
BUN SERPL-MCNC: 17 MG/DL (ref 7–30)
CALCIUM SERPL-MCNC: 9.4 MG/DL (ref 8.5–10.1)
CHLORIDE SERPL-SCNC: 106 MMOL/L (ref 94–109)
CO2 SERPL-SCNC: 31 MMOL/L (ref 20–32)
CREAT SERPL-MCNC: 1 MG/DL (ref 0.52–1.04)
CREAT UR-MCNC: 187 MG/DL
GFR SERPL CREATININE-BSD FRML MDRD: 58 ML/MIN/{1.73_M2}
GLUCOSE SERPL-MCNC: 110 MG/DL (ref 70–99)
MICROALBUMIN UR-MCNC: 15 MG/L
MICROALBUMIN/CREAT UR: 7.91 MG/G CR (ref 0–25)
POTASSIUM SERPL-SCNC: 3.9 MMOL/L (ref 3.4–5.3)
SODIUM SERPL-SCNC: 140 MMOL/L (ref 133–144)

## 2020-11-11 ENCOUNTER — ANCILLARY PROCEDURE (OUTPATIENT)
Dept: MAMMOGRAPHY | Facility: CLINIC | Age: 65
End: 2020-11-11
Payer: COMMERCIAL

## 2020-11-11 DIAGNOSIS — Z12.31 ENCOUNTER FOR SCREENING MAMMOGRAM FOR BREAST CANCER: ICD-10-CM

## 2020-11-11 PROCEDURE — 77067 SCR MAMMO BI INCL CAD: CPT | Mod: TC | Performed by: RADIOLOGY

## 2020-11-11 NOTE — RESULT ENCOUNTER NOTE
Dear Livia,    Your recent test results are attached.      Normal mammogram.    If you have any questions please feel free to contact [phone] or myself via [employer].    Sincerely,  Lissett Conklin, CNP

## 2020-11-23 ENCOUNTER — VIRTUAL VISIT (OUTPATIENT)
Dept: FAMILY MEDICINE | Facility: OTHER | Age: 65
End: 2020-11-23

## 2020-11-23 NOTE — PROGRESS NOTES
"Date: 2020 12:55:52  Clinician: Sony Avilez  Clinician NPI: [id number]  Patient: Livia Gill  Patient : 1955  Patient Address: [address] [place] [id number]  Patient Phone: [phone]  Visit Protocol: URI  Patient Summary:  Livia is a 65 year old ( : 1955 ) female who initiated a [facility] Visit for COVID-19 (Coronavirus) evaluation and screening. When asked the question \"Please sign me up to receive news, health information and promotions. \", Livia responded \"No\".    When asked when her symptoms started, Livia reported that she does not have any symptoms.   She denies taking antibiotic medication in the past month and having recent facial or sinus surgery in the past 60 days.    Pertinent COVID-19 (Coronavirus) information  Livia does not work or volunteer as healthcare worker or a . 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 Additional job details as reported by the patient (free text):  working from home for [employer].   In the past 14 days, she has not lived in a congregate living setting.   Livia has not had a close contact with a laboratory-confirmed COVID-19 patient within the last 14 days.    Since 2019, Livia has been tested for COVID-19 and has had upper respiratory infection (URI) or influenza-like illness.      Result of COVID-19 test: Negative     Date of her COVID-19 test: 2020     Date(s) of previous URI or influenza-like illness (free-text): exact date unknown     Symptoms Livia experienced during previous URI or influenza-like illness as reported by the patient (free-text): stuffy nose, dry cough        Pertinent medical history  Livia does not get yeast infections when she takes antibiotics.   Livia does not need a return to work/school note.   Weight: 199 lbs   Livia does not smoke or use smokeless tobacco.   Additional information as reported by the patient (free " text): I will need a follow up Covid test to the Rapid Results test conducted at the nursing home on Tuesday morning.   Weight: 199 lbs    MEDICATIONS: metformin oral, lisinopril oral, rosuvastatin oral, Dry Eye Omega Benefits oral, Adult Low Dose Aspirin oral, ALLERGIES: NKDA  Clinician Response:  Dear Livia,   Based on the details you've shared, you are concerned about contact with someone who may have been exposed to coronavirus (COVID-19). Based on [facility] guidelines you do not need to be tested at this time.  Testing for people who do not have symptoms is only required in certain instances, including direct contact with a person who has tested positive for COVID-19, or for those who are traveling to locations where testing is required beforehand.  Please redo an [facility] visit or call your clinic if you think we missed needed information, your exposure information has changed, or you develop symptoms.  What are the symptoms of COVID-19?  The most common symptoms are cough, fever and trouble breathing. Less common symptoms include headache, body aches, fatigue (feeling very tired), chills, sore throat, stuffy or runny nose, diarrhea (loose poop), loss of taste or smell, belly pain, and nausea or vomiting (feeling sick to your stomach or throwing up).  Where can I get more information?  [facility] -- About COVID-19: [email or web address]  CDC -- What to Do If You're Sick: www.cdc.gov/coronavirus/2019-ncov/about/steps-when-sick.html  CDC -- Ending Home Isolation: www.cdc.gov/coronavirus/2019-ncov/hcp/disposition-in-home-patients.html  CDC -- Caring for Someone: www.cdc.gov/coronavirus/2019-ncov/if-you-are-sick/care-for-someone.html  [facility] -- Interim Guidance for Hospital Discharge to Home: www.health.[facility].[place]/diseases/coronavirus/hcp/hospdischarge.pdf  [facility] clinical trials (COVID-19 research studies): clinicalaffairs.[facility]-clinical-trials  Below are the COVID-19  hotlines at the [place] Department of [facility] ([facility]). Interpreters are available.  For health questions: Call 904-675-7777 or 1-977.800.2174 (7 a.m. to 7 p.m.)  For questions about schools and childcare: Call 290-773-1359 or 1-323.937.4396 (7 a.m. to 7 p.m.)    Diagnosis: Contact with and (suspected) exposure to other viral communicable diseases  Diagnosis ICD: Z20.828

## 2020-12-28 ENCOUNTER — TRANSFERRED RECORDS (OUTPATIENT)
Dept: HEALTH INFORMATION MANAGEMENT | Facility: CLINIC | Age: 65
End: 2020-12-28

## 2021-03-26 DIAGNOSIS — E78.5 HYPERLIPIDEMIA LDL GOAL <160: ICD-10-CM

## 2021-03-26 RX ORDER — ROSUVASTATIN CALCIUM 40 MG/1
40 TABLET, COATED ORAL DAILY
Qty: 90 TABLET | Refills: 0 | Status: SHIPPED | OUTPATIENT
Start: 2021-03-26 | End: 2021-06-24

## 2021-04-11 ENCOUNTER — HEALTH MAINTENANCE LETTER (OUTPATIENT)
Age: 66
End: 2021-04-11

## 2021-05-26 DIAGNOSIS — E11.9 TYPE 2 DIABETES MELLITUS WITHOUT COMPLICATION, WITHOUT LONG-TERM CURRENT USE OF INSULIN (H): ICD-10-CM

## 2021-05-27 RX ORDER — LISINOPRIL 10 MG/1
TABLET ORAL
Qty: 90 TABLET | Refills: 1 | Status: SHIPPED | OUTPATIENT
Start: 2021-05-27 | End: 2021-10-08

## 2021-06-06 ENCOUNTER — HEALTH MAINTENANCE LETTER (OUTPATIENT)
Age: 66
End: 2021-06-06

## 2021-06-22 DIAGNOSIS — E78.5 HYPERLIPIDEMIA LDL GOAL <160: ICD-10-CM

## 2021-06-24 RX ORDER — ROSUVASTATIN CALCIUM 40 MG/1
40 TABLET, COATED ORAL DAILY
Qty: 90 TABLET | Refills: 0 | Status: SHIPPED | OUTPATIENT
Start: 2021-06-24 | End: 2021-07-02

## 2021-06-24 NOTE — TELEPHONE ENCOUNTER
"Routing refill request to provider for review/approval because:  Labs not current:  LDL    Requested Prescriptions   Pending Prescriptions Disp Refills     rosuvastatin (CRESTOR) 40 MG tablet [Pharmacy Med Name: ROSUVASTATIN CALCIUM 40 MG TAB] 90 tablet 0     Sig: TAKE 1 TABLET (40 MG) BY MOUTH DAILY NEED APPOINTMENT FOR FURTHER REFILLS.       Statins Protocol Failed - 6/23/2021  1:54 PM        Failed - LDL on file in past 12 months     Recent Labs   Lab Test 03/11/20  0944   LDL 17             Passed - No abnormal creatine kinase in past 12 months     No lab results found.             Passed - Recent (12 mo) or future (30 days) visit within the authorizing provider's specialty     Patient has had an office visit with the authorizing provider or a provider within the authorizing providers department within the previous 12 mos or has a future within next 30 days. See \"Patient Info\" tab in inbasket, or \"Choose Columns\" in Meds & Orders section of the refill encounter.              Passed - Medication is active on med list        Passed - Patient is age 18 or older        Passed - No active pregnancy on record        Passed - No positive pregnancy test in past 12 months           AMY Hernández RN  [facility], [place]          "

## 2021-06-25 DIAGNOSIS — E11.9 TYPE 2 DIABETES MELLITUS WITHOUT COMPLICATION, WITHOUT LONG-TERM CURRENT USE OF INSULIN (H): ICD-10-CM

## 2021-06-28 NOTE — TELEPHONE ENCOUNTER
Prescription approved per [facility] Refill Protocol.    Pt has an appt scheduled for 7/2.    Lesli Ovalle RN  [facility]

## 2021-07-02 ENCOUNTER — OFFICE VISIT (OUTPATIENT)
Dept: FAMILY MEDICINE | Facility: CLINIC | Age: 66
End: 2021-07-02
Payer: COMMERCIAL

## 2021-07-02 VITALS
TEMPERATURE: 97.9 F | BODY MASS INDEX: 33.36 KG/M2 | WEIGHT: 195.4 LBS | HEART RATE: 82 BPM | HEIGHT: 64 IN | OXYGEN SATURATION: 96 % | SYSTOLIC BLOOD PRESSURE: 110 MMHG | DIASTOLIC BLOOD PRESSURE: 71 MMHG

## 2021-07-02 DIAGNOSIS — Z00.00 ENCOUNTER FOR MEDICARE ANNUAL WELLNESS EXAM: Primary | ICD-10-CM

## 2021-07-02 DIAGNOSIS — K42.9 UMBILICAL HERNIA WITHOUT OBSTRUCTION AND WITHOUT GANGRENE: ICD-10-CM

## 2021-07-02 DIAGNOSIS — E78.5 HYPERLIPIDEMIA LDL GOAL <160: ICD-10-CM

## 2021-07-02 DIAGNOSIS — L98.9 SKIN LESION: ICD-10-CM

## 2021-07-02 DIAGNOSIS — E11.9 TYPE 2 DIABETES MELLITUS WITHOUT COMPLICATION, WITHOUT LONG-TERM CURRENT USE OF INSULIN (H): ICD-10-CM

## 2021-07-02 LAB
CHOLEST SERPL-MCNC: 83 MG/DL
HBA1C MFR BLD: 7.8 % (ref 0–5.6)
HDLC SERPL-MCNC: 38 MG/DL
LDLC SERPL CALC-MCNC: <1 MG/DL
NONHDLC SERPL-MCNC: 45 MG/DL
TRIGL SERPL-MCNC: 245 MG/DL

## 2021-07-02 PROCEDURE — 80061 LIPID PANEL: CPT | Performed by: NURSE PRACTITIONER

## 2021-07-02 PROCEDURE — 83036 HEMOGLOBIN GLYCOSYLATED A1C: CPT | Performed by: NURSE PRACTITIONER

## 2021-07-02 PROCEDURE — 36415 COLL VENOUS BLD VENIPUNCTURE: CPT | Performed by: NURSE PRACTITIONER

## 2021-07-02 PROCEDURE — 99213 OFFICE O/P EST LOW 20 MIN: CPT | Mod: 25 | Performed by: NURSE PRACTITIONER

## 2021-07-02 PROCEDURE — 99397 PER PM REEVAL EST PAT 65+ YR: CPT | Performed by: NURSE PRACTITIONER

## 2021-07-02 RX ORDER — ROSUVASTATIN CALCIUM 40 MG/1
40 TABLET, COATED ORAL DAILY
Qty: 90 TABLET | Refills: 1 | Status: SHIPPED | OUTPATIENT
Start: 2021-07-02 | End: 2022-04-07

## 2021-07-02 ASSESSMENT — ENCOUNTER SYMPTOMS
HEADACHES: 0
MYALGIAS: 0
NAUSEA: 0
JOINT SWELLING: 0
HEMATURIA: 0
PARESTHESIAS: 0
HEMATOCHEZIA: 0
FREQUENCY: 0
HEARTBURN: 0
EYE PAIN: 0
FEVER: 0
CHILLS: 0
SORE THROAT: 0
BREAST MASS: 0
SHORTNESS OF BREATH: 0
DIARRHEA: 0
NERVOUS/ANXIOUS: 0
ABDOMINAL PAIN: 0
COUGH: 0
DIZZINESS: 0
ARTHRALGIAS: 0
DYSURIA: 0
WEAKNESS: 0
PALPITATIONS: 0
CONSTIPATION: 0

## 2021-07-02 ASSESSMENT — PAIN SCALES - GENERAL: PAINLEVEL: NO PAIN (0)

## 2021-07-02 ASSESSMENT — MIFFLIN-ST. JEOR: SCORE: 1402.2

## 2021-07-02 ASSESSMENT — ACTIVITIES OF DAILY LIVING (ADL): CURRENT_FUNCTION: NO ASSISTANCE NEEDED

## 2021-07-02 NOTE — PROGRESS NOTES
"SUBJECTIVE:   Livia Gill is a 66 year old female who presents for Preventive Visit.      Patient has been advised of split billing requirements and indicates understanding: Yes   Are you in the first 12 months of your Medicare coverage?  No    Healthy Habits:     In general, how would you rate your overall health?  Good    Frequency of exercise:  None    Do you usually eat at least 4 servings of fruit and vegetables a day, include whole grains    & fiber and avoid regularly eating high fat or \"junk\" foods?  No    Taking medications regularly:  Yes    Medication side effects:  Not applicable    Ability to successfully perform activities of daily living:  No assistance needed    Home Safety:  Lack of grab bars in the bathroom    Hearing Impairment:  Difficulty following a conversation in a noisy restaurant or crowded room    In the past 6 months, have you been bothered by leaking of urine? Yes    In general, how would you rate your overall mental or emotional health?  Good      PHQ-2 Total Score: 0    Additional concerns today:  No    Do you feel safe in your environment? Yes    Have you ever done Advance Care Planning? (For example, a Health Directive, POLST, or a discussion with a medical provider or your loved ones about your wishes): No, advance care planning information given to patient to review.  Patient plans to discuss their wishes with loved ones or provider.         Fall risk  Fallen 2 or more times in the past year?: No  Any fall with injury in the past year?: No    Cognitive Screening   1) Repeat 3 items (Leader, Season, Table)    2) Clock draw:   NORMAL  3) 3 item recall:   Recalls 3 objects  Results: 3 items recalled: COGNITIVE IMPAIRMENT LESS LIKELY    Mini-CogTM Copyright RAUDEL Varma. Licensed by the author for use in [facility]; reprinted with permission (juan@.[email or web address]). All rights reserved.      Do you have sleep apnea, excessive snoring or daytime drowsiness?: no    Reviewed and " updated as needed this visit by clinical staff  Tobacco  Allergies  Meds              Reviewed and updated as needed this visit by Provider                Social History     Tobacco Use     Smoking status: Never Smoker     Smokeless tobacco: Never Used   Substance Use Topics     Alcohol use: No         Alcohol Use 7/2/2021   Prescreen: >3 drinks/day or >7 drinks/week? Not Applicable   Prescreen: >3 drinks/day or >7 drinks/week? -         Diabetes Follow-up    How often are you checking your blood sugar? One time daily  What time of day are you checking your blood sugars (select all that apply)?  180 fasting at times, 2 hours post prandial 140  Have you had any blood sugars above 200?  No  Have you had any blood sugars below 70?  No    What symptoms do you notice when your blood sugar is low?  Not applicable    What concerns do you have today about your diabetes? None     Do you have any of these symptoms? (Select all that apply)  No numbness or tingling in feet.  No redness, sores or blisters on feet.  No complaints of excessive thirst.  No reports of blurry vision.  No significant changes to weight.    Have you had a diabetic eye exam in the last 12 months? Yes- Date of last eye exam: 8/1/2020,  Location: [facility]        BP Readings from Last 2 Encounters:   07/02/21 110/71   10/13/20 118/72     Hemoglobin A1C (%)   Date Value   10/13/2020 7.0 (H)   06/05/2020 7.1 (H)     LDL Cholesterol Calculated (mg/dL)   Date Value   03/11/2020 17   10/05/2018 15                 Current providers sharing in care for this patient include: Patient Care Team:  No Ref-Primary, Physician as PCP - Lissett Kilgore APRN CNP as Assigned PCP    The following health maintenance items are reviewed in Epic and correct as of today:  Health Maintenance Due   Topic Date Due     ANNUAL REVIEW OF HM ORDERS  Never done     ADVANCE CARE PLANNING  Never done     MEDICARE ANNUAL WELLNESS VISIT  06/06/2018     EYE  EXAM  10/29/2019     LIPID  03/11/2021     DIABETIC FOOT EXAM  03/11/2021     FALL RISK ASSESSMENT  03/11/2021     A1C  04/13/2021     Lab work is in process  BP Readings from Last 3 Encounters:   07/02/21 110/71   10/13/20 118/72   03/11/20 136/74    Wt Readings from Last 3 Encounters:   07/02/21 88.6 kg (195 lb 6.4 oz)   10/13/20 90.7 kg (200 lb)   03/11/20 91.5 kg (201 lb 12.8 oz)                  Patient Active Problem List   Diagnosis     Hyperlipidemia LDL goal <160     Type 2 diabetes mellitus without complication, without long-term current use of insulin (H)     Class 1 obesity due to excess calories with serious comorbidity and body mass index (BMI) of 33.0 to 33.9 in adult     Irritable bowel syndrome with diarrhea     Past Surgical History:   Procedure Laterality Date     CATARACT IOL, RT/LT Right 1/2015    right cataract      COLONOSCOPY  00, 06, 12, 17    polyps, every 5 years     D & C  1989     HC FEMUR/KNEE SURG UNLISTED      Lt. knee dislocation     TONSILLECTOMY & ADENOIDECTOMY      age 14     ZZC NONSPECIFIC PROCEDURE  1989    laparscopy for infertility       Social History     Tobacco Use     Smoking status: Never Smoker     Smokeless tobacco: Never Used   Substance Use Topics     Alcohol use: No     Family History   Problem Relation Age of Onset     Diabetes Father      Other Cancer Sister 61        Ovarian Cancer         Current Outpatient Medications   Medication Sig Dispense Refill     aspirin 81 MG tablet Take 1 tablet (81 mg) by mouth daily 30 tablet      IBUPROFEN PO        lisinopril (ZESTRIL) 10 MG tablet TAKE 1 TABLET BY MOUTH EVERY DAY 90 tablet 1     metFORMIN (GLUCOPHAGE) 500 MG tablet TAKE 1 TABLET BY MOUTH TWICE A DAY WITH MEALS 180 tablet 0     Multiple Vitamins-Minerals (DRY EYE FORMULA PO) Omega 3       MULTIVITAMIN TABS   OR 1 TABLET DAILY       NEW MED Keraviatin Hair and scalp supplement. 2 daily       order for DME Equipment being ordered: Left Knee Brace  Left knee  Osteoarthritis, Left Knee pain. 1 Units 0     rosuvastatin (CRESTOR) 40 MG tablet TAKE 1 TABLET (40 MG) BY MOUTH DAILY NEED APPOINTMENT FOR FURTHER REFILLS. 90 tablet 0     Cholecalciferol (VITAMIN D-3) 25 MCG (1000 UT) CAPS Take 3,000 Units by mouth daily       No Known Allergies      FHS-7:   Breast CA Risk Assessment (FHS-7) 7/2/2021   Did any of your first-degree relatives have breast or ovarian cancer? Yes   Did any of your relatives have bilateral breast cancer? No   Did any man in your family have breast cancer? No   Did any woman in your family have breast and ovarian cancer? Yes   Did any woman in your family have breast cancer before age 50 y? No   Do you have 2 or more relatives with breast and/or ovarian cancer? No   Do you have 2 or more relatives with breast and/or bowel cancer? No       Mammogram Screening: Recommended mammography every 1-2 years with patient discussion and risk factor consideration  Pertinent mammograms are reviewed under the imaging tab.    Review of Systems   Constitutional: Negative for chills and fever.   HENT: Negative for congestion, ear pain, hearing loss and sore throat.    Eyes: Negative for pain and visual disturbance.   Respiratory: Negative for cough and shortness of breath.    Cardiovascular: Negative for chest pain, palpitations and peripheral edema.   Gastrointestinal: Negative for abdominal pain, constipation, diarrhea, heartburn, hematochezia and nausea.   Breasts:  Negative for tenderness, breast mass and discharge.   Genitourinary: Negative for dysuria, frequency, genital sores, hematuria, pelvic pain, urgency, vaginal bleeding and vaginal discharge.   Musculoskeletal: Negative for arthralgias, joint swelling and myalgias.   Skin: Negative for rash.   Neurological: Negative for dizziness, weakness, headaches and paresthesias.   Psychiatric/Behavioral: Negative for mood changes. The patient is not nervous/anxious.          OBJECTIVE:   /71   Pulse 82   Temp  "97.9  F (36.6  C) (Oral)   Ht 1.627 m (5' 4.06\")   Wt 88.6 kg (195 lb 6.4 oz)   SpO2 96%   BMI 33.48 kg/m   Estimated body mass index is 33.48 kg/m  as calculated from the following:    Height as of this encounter: 1.627 m (5' 4.06\").    Weight as of this encounter: 88.6 kg (195 lb 6.4 oz).  Physical Exam  GENERAL: healthy, alert and no distress  EYES: Eyes grossly normal to inspection, PERRL and conjunctivae and sclerae normal  HENT: ear canals and TM's normal, nose and mouth without ulcers or lesions  NECK: no adenopathy, no asymmetry, masses, or scars, thyroid normal to palpation and no carotid bruits  RESP: lungs clear to auscultation - no rales, rhonchi or wheezes  BREAST: normal without masses, tenderness or nipple discharge and no palpable axillary masses or adenopathy  CV: regular rate and rhythm, normal S1 S2, no S3 or S4, no murmur, click or rub, no peripheral edema and peripheral pulses strong  ABDOMEN: soft, nontender, no hepatosplenomegaly, no masses and bowel sounds normal; umbilical hernia present.  MS: no gross musculoskeletal defects noted, no edema  SKIN: flesh colored papular lesions to forehead, seborrheic keratosis beneath bilateral breasts.  NEURO: Normal strength and tone, mentation intact and speech normal  PSYCH: mentation appears normal, affect normal/bright  Diabetic foot exam: normal DP and PT pulses, no trophic changes or ulcerative lesions and normal sensory exam    Diagnostic Test Results:  Labs reviewed in Epic  pending    ASSESSMENT / PLAN:   1. Encounter for Medicare annual wellness exam      2. Type 2 diabetes mellitus without complication, without long-term current use of insulin (H)  Stable.  Continue current treatment plan and medications.   - HEMOGLOBIN A1C  - metFORMIN (GLUCOPHAGE) 500 MG tablet; TAKE 1 TABLET BY MOUTH TWICE A DAY WITH MEALS  Dispense: 180 tablet; Refill: 1    3. Hyperlipidemia LDL goal <160  Stable.  Continue current treatment plan and medications.   - " "Lipid panel reflex to direct LDL Fasting  - rosuvastatin (CRESTOR) 40 MG tablet; Take 1 tablet (40 mg) by mouth daily  Dispense: 90 tablet; Refill: 1    4. Skin lesion    - ADULT DERMATOLOGY REFERRAL; Future    5. Umbilical hernia without obstruction and without gangrene  Patient to continue to monitor.      Patient has been advised of split billing requirements and indicates understanding: Yes  COUNSELING:  Reviewed preventive health counseling, as reflected in patient instructions       Regular exercise       Healthy diet/nutrition    Estimated body mass index is 33.48 kg/m  as calculated from the following:    Height as of this encounter: 1.627 m (5' 4.06\").    Weight as of this encounter: 88.6 kg (195 lb 6.4 oz).    Weight management plan: Discussed healthy diet and exercise guidelines    She reports that she has never smoked. She has never used smokeless tobacco.      Appropriate preventive services were discussed with this patient, including applicable screening as appropriate for cardiovascular disease, diabetes, osteopenia/osteoporosis, and glaucoma.  As appropriate for age/gender, discussed screening for colorectal cancer, prostate cancer, breast cancer, and cervical cancer. Checklist reviewing preventive services available has been given to the patient.    Reviewed patients plan of care and provided an AVS. The Basic Care Plan (routine screening as documented in Health Maintenance) for Livia meets the Care Plan requirement. This Care Plan has been established and reviewed with the Patient.    Counseling Resources:  ATP IV Guidelines  Pooled Cohorts Equation Calculator  Breast Cancer Risk Calculator  Breast Cancer: Medication to Reduce Risk  FRAX Risk Assessment  ICSI Preventive Guidelines  Dietary Guidelines for Americans, 2010  [employer]'s MyPlate  ASA Prophylaxis  Lung CA Screening    ANN Handy CNP  [facility]    Identified Health Risks:  "

## 2021-07-02 NOTE — PATIENT INSTRUCTIONS
Patient Education   Personalized Prevention Plan  You are due for the preventive services outlined below.  Your care team is available to assist you in scheduling these services.  If you have already completed any of these items, please share that information with your care team to update in your medical record.  Health Maintenance Due   Topic Date Due     ANNUAL REVIEW OF HM ORDERS  Never done     Discuss Advance Care Planning  Never done     Eye Exam  10/29/2019     Cholesterol Lab  03/11/2021     Diabetic Foot Exam  03/11/2021     FALL RISK ASSESSMENT  03/11/2021     A1C Lab  04/13/2021       Exercise for a Healthier Heart  You may wonder how you can improve the health of your heart. If you re thinking about exercise, you re on the right track. You don t need to become an athlete. But you do need a certain amount of brisk exercise to help strengthen your heart. If you have been diagnosed with a heart condition, your healthcare provider may advise exercise to help stabilize your condition. To help make exercise a habit, choose safe, fun activities.      Exercise with a friend. When activity is fun, you're more likely to stick with it.   Before you start  Check with your healthcare provider before starting an exercise program. This is especially important if you have not been active for a while. It's also important if you have a long-term (chronic) health problem such as heart disease, diabetes, or obesity. Or if you are at high risk for having these problems.   Why exercise?  Exercising regularly offers many healthy rewards. It can help you do all of the following:     Improve your blood cholesterol level to help prevent further heart trouble    Lower your blood pressure to help prevent a stroke or heart attack    Control diabetes, or reduce your risk of getting this disease    Improve your heart and lung function    Reach and stay at a healthy weight    Make your muscles stronger so you can stay  active    Prevent falls and fractures by slowing the loss of bone mass (osteoporosis)    Manage stress better    Reduce your blood pressure    Improve your sense of self and your body image  Exercise tips      Ease into your routine. Set small goals. Then build on them. If you are not sure what your activity level should be, talk with your healthcare provider first before starting an exercise routine.    Exercise on most days. Aim for a total of 150 minutes (2 hours and 30 minutes) or more of moderate-intensity aerobic activity each week. Or 75 minutes (1 hour and 15 minutes) or more of vigorous-intensity aerobic activity each week. Or try for a combination of both. Moderate activity means that you breathe heavier and your heart rate increases but you can still talk. Think about doing 40 minutes of moderate exercise, 3 to 4 times a week. For best results, activity should last for about 40 minutes to lower blood pressure and cholesterol. It's OK to work up to the 40-minute period over time. Examples of moderate-intensity activity are walking 1 mile in 15 minutes. Or doing 30 to 45 minutes of yard work.    Step up your daily activity level.  Along with your exercise program, try being more active the whole day. Walk instead of drive. Or park further away so that you take more steps each day. Do more household tasks or yard work. You may not be able to meet the advised mount of physical activity. But doing some moderate- or vigorous-intensity aerobic activity can help reduce your risk for heart disease. Your healthcare provider can help you figure out what is best for you.    Choose 1 or more activities you enjoy.  Walking is one of the easiest things you can do. You can also try swimming, riding a bike, dancing, or taking an exercise class.    When to call your healthcare provider  Call your healthcare provider if you have any of these:     Chest pain or feel dizzy or lightheaded    Burning, tightness, pressure, or  heaviness in your chest, neck, shoulders, back, or arms    Abnormal shortness of breath    More joint or muscle pain    A very fast or irregular heartbeat (palpitations)  [employer] last reviewed this educational content on 7/1/2019 2000-2021 [employer]. All rights reserved. This information is not intended as a substitute for professional medical care. Always follow your healthcare professional's instructions.          Understanding USDA MyPlate  The USDA has guidelines to help you make healthy food choices. These are called MyPlate. MyPlate shows the food groups that make up healthy meals using the image of a place setting. Before you eat, think about the healthiest choices for what to put on your plate or in your cup or bowl. To learn more about building a healthy plate, visit www.choosemyplate.gov.    The food groups    Fruits. Any fruit or 100% fruit juice counts as part of the Fruit Group. Fruits may be fresh, canned, frozen, or dried, and may be whole, cut-up, or pureed. Make 1/2 of your plate fruits and vegetables.    Vegetables. Any vegetable or 100% vegetable juice counts as a member of the Vegetable Group. Vegetables may be fresh, frozen, canned, or dried. They can be served raw or cooked and may be whole, cut-up, or mashed. Make 1/2 of your plate fruits and vegetables.    Grains. All foods made from grains are part of the Grains Group. These include wheat, rice, oats, cornmeal, and barley. Grains are often used to make foods such as bread, pasta, oatmeal, cereal, tortillas, and grits. Grains should be no more than 1/4 of your plate. At least half of your grains should be whole grains.    Protein. This group includes meat, poultry, seafood, beans and peas, eggs, processed soy products (such as tofu), nuts (including nut butters), and seeds. Make protein choices no more than 1/4 of your plate. Meat and poultry choices should be lean or low fat.    Dairy. The Dairy Group includes all fluid  milk products and foods made from milk that contain calcium, such as yogurt and cheese. (Foods that have little calcium, such as cream, butter, and cream cheese, are not part of this group.) Most dairy choices should be low-fat or fat-free.    Oils. Oils aren't a food group, but they do contain essential nutrients. However it's important to watch your intake of oils. These are fats that are liquid at room temperature. They include canola, corn, olive, soybean, vegetable, and sunflower oil. Foods that are mainly oil include mayonnaise, certain salad dressings, and soft margarines. You likely already get your daily oil allowance from the foods you eat.  Things to limit  Eating healthy also means limiting these things in your diet:       Salt (sodium). Many processed foods have a lot of sodium. To keep sodium intake down, eat fresh vegetables, meats, poultry, and seafood when possible. Purchase low-sodium, reduced-sodium, or no-salt-added food products at the store. And don't add salt to your meals at home. Instead, season them with herbs and spices such as dill, oregano, cumin, and paprika. Or try adding flavor with lemon or lime zest and juice.    Saturated fat. Saturated fats are most often found in animal products such as beef, pork, and chicken. They are often solid at room temperature, such as butter. To reduce your saturated fat intake, choose leaner cuts of meat and poultry. And try healthier cooking methods such as grilling, broiling, roasting, or baking. For a simple lower-fat swap, use plain nonfat yogurt instead of mayonnaise when making potato salad or macaroni salad.    Added sugars. These are sugars added to foods. They are in foods such as ice cream, candy, soda, fruit drinks, sports drinks, energy drinks, cookies, pastries, jams, and syrups. Cut down on added sugars by sharing sweet treats with a family member or friend. You can also choose fruit for dessert, and drink water or other unsweetened  joey     [employer] last reviewed this educational content on 6/1/2020 2000-2021 [employer]. All rights reserved. This information is not intended as a substitute for professional medical care. Always follow your healthcare professional's instructions.          Signs of Hearing Loss      Hearing much better with one ear can be a sign of hearing loss.   Hearing loss is a problem shared by many people. In fact, it is one of the most common health problems, particularly as people age. Most people age 65 and older have some hearing loss. By age 80, almost everyone does. Hearing loss often occurs slowly over the years. So you may not realize your hearing has gotten worse.  Have your hearing checked  Call your healthcare provider if you:    Have to strain to hear normal conversation    Have to watch other people s faces very carefully to follow what they re saying    Need to ask people to repeat what they ve said    Often misunderstand what people are saying    Turn the volume of the television or radio up so high that others complain    Feel that people are mumbling when they re talking to you    Find that the effort to hear leaves you feeling tired and irritated    Notice, when using the phone, that you hear better with one ear than the other  [employer] last reviewed this educational content on 1/1/2020 2000-2021 [employer]. All rights reserved. This information is not intended as a substitute for professional medical care. Always follow your healthcare professional's instructions.          Urinary Incontinence, Female (Adult)   Urinary incontinence means loss of bladder control. This problem affects many women, especially as they get older. If you have incontinence, you may be embarrassed to ask for help. But know that this problem can be treated.   Types of Incontinence  There are different types of incontinence. Two of the main types are described here. You can have more than  one type.     Stress incontinence. With this type, urine leaks when pressure (stress) is put on the bladder. This may happen when you cough, sneeze, or laugh. Stress incontinence most often occurs because the pelvic floor muscles that support the bladder and urethra are weak. This can happen after pregnancy and vaginal childbirth or a hysterectomy. It can also be due to excess body weight or hormone changes.    Urge incontinence (also called overactive bladder). With this type, a sudden urge to urinate is felt often. This may happen even though there may not be much urine in the bladder. The need to urinate often during the night is common. Urge incontinence most often occurs because of bladder spasms. This may be due to bladder irritation or infection. Damage to bladder nerves or pelvic muscles, constipation, and certain medicines can also lead to urge incontinence.  Treatment depends on the cause. Further evaluation is needed to find the type you have. This will likely include an exam and certain tests. Based on the results, you and your healthcare provider can then plan treatment. Until a diagnosis is made, the home care tips below can help ease symptoms.   Home care    Do pelvic floor muscle exercises, if they are prescribed. The pelvic floor muscles help support the bladder and urethra. Many women find that their symptoms improve when doing special exercises that strengthen these muscles. To do the exercises, contract the muscles you would use to stop your stream of urine. But do this when you re not urinating. Hold for 10 seconds, then relax. Repeat 10 to 20 times in a row, at least 3 times a day. Your healthcare provider may give you other instructions for how to do the exercises and how often.    Keep a bladder diary. This helps track how often and how much you urinate over a set period of time. Bring this diary with you to your next visit with the provider. The information can help your provider learn more  about your bladder problem.    Lose weight, if advised to by your provider. Extra weight puts pressure on the bladder. Your provider can help you create a weight-loss plan that s right for you. This may include exercising more and making certain diet changes.    Don't have foods and drinks that may irritate the bladder. These can include alcohol and caffeinated drinks.    Quit smoking. Smoking and other tobacco use can lead to a long-term (chronic) cough that strains the pelvic floor muscles. Smoking may also damage the bladder and urethra. Talk with your provider about treatments or methods you can use to quit smoking.    If drinking large amounts of fluid makes you have symptoms, you may be advised to limit your fluid intake. You may also be advised to drink most of your fluids during the day and to limit fluids at night.    If you re worried about urine leakage or accidents, you may wear absorbent pads to catch urine. Change the pads often. This helps reduce discomfort. It may also reduce the risk of skin or bladder infections.    Follow-up care  Follow up with your healthcare provider, or as directed. It may take some to find the right treatment for your problem. But healthy lifestyle changes can be made right away. These include such things as exercising on a regular basis, eating a healthy diet, losing weight (if needed), and quitting smoking. Your treatment plan may include special therapies or medicines. Certain procedures or surgery may also be options. Talk about any questions you have with your provider.   When to seek medical advice  Call the healthcare provider right away if any of these occur:    Fever of 100.4 F (38 C) or higher, or as directed by your provider    Bladder pain or fullness    Belly swelling    Nausea or vomiting    Back pain    Weakness, dizziness, or fainting  [employer] last reviewed this educational content on 1/1/2020 2000-2021 [employer]. All rights reserved. This  information is not intended as a substitute for professional medical care. Always follow your healthcare professional's instructions.

## 2021-07-05 NOTE — RESULT ENCOUNTER NOTE
Dear Livia,    Your recent test results are attached.      Good cholesterol.    If you have any questions please feel free to contact [phone] or myself via [email or web address].    Sincerely,  Lissett Conklin, CNP

## 2021-08-25 ENCOUNTER — OFFICE VISIT (OUTPATIENT)
Dept: DERMATOLOGY | Facility: CLINIC | Age: 66
End: 2021-08-25
Payer: COMMERCIAL

## 2021-08-25 VITALS
SYSTOLIC BLOOD PRESSURE: 124 MMHG | WEIGHT: 191 LBS | DIASTOLIC BLOOD PRESSURE: 76 MMHG | BODY MASS INDEX: 28.29 KG/M2 | HEART RATE: 77 BPM | HEIGHT: 69 IN

## 2021-08-25 DIAGNOSIS — L73.8 SEBACEOUS HYPERPLASIA: Primary | ICD-10-CM

## 2021-08-25 DIAGNOSIS — L72.0 MILIA: ICD-10-CM

## 2021-08-25 PROCEDURE — 99213 OFFICE O/P EST LOW 20 MIN: CPT | Performed by: PHYSICIAN ASSISTANT

## 2021-08-25 RX ORDER — TRETINOIN 0.5 MG/G
CREAM TOPICAL
Qty: 20 G | Refills: 4 | Status: SHIPPED | OUTPATIENT
Start: 2021-08-25 | End: 2023-07-25

## 2021-08-25 ASSESSMENT — MIFFLIN-ST. JEOR: SCORE: 1460.75

## 2021-08-25 NOTE — PROGRESS NOTES
Livia Gill is an extremely pleasant 66 year old year old female patient here today for bumps on forehead. Present for years. No pain or bleeding. Patient has no other skin complaints today.  Remainder of the HPI, Meds, PMH, Allergies, FH, and SH was reviewed in chart.    Past Medical History:   Diagnosis Date     Colon polyps 12/2017    screen every 5 years      Hypercholesterolemia      Hyperglycemia      Metabolic syndrome      Obesity      Vitamin D deficiency        Past Surgical History:   Procedure Laterality Date     CATARACT IOL, RT/LT Right 1/2015    right cataract      COLONOSCOPY  00, 06, 12, 17    polyps, every 5 years     D & C  1989     HC FEMUR/KNEE SURG UNLISTED      Lt. knee dislocation     TONSILLECTOMY & ADENOIDECTOMY      age 14     ZZC NONSPECIFIC PROCEDURE  1989    laparscopy for infertility        Family History   Problem Relation Age of Onset     Diabetes Father      Other Cancer Sister 61        Ovarian Cancer       Social History     Socioeconomic History     Marital status:      Spouse name: Not on file     Number of children: Not on file     Years of education: Not on file     Highest education level: Not on file   Occupational History     Not on file   Tobacco Use     Smoking status: Never Smoker     Smokeless tobacco: Never Used   Substance and Sexual Activity     Alcohol use: No     Drug use: No     Sexual activity: Never   Other Topics Concern     Parent/sibling w/ CABG, MI or angioplasty before 65F 55M? No   Social History Narrative     Not on file     Social Determinants of Health     Financial Resource Strain:      Difficulty of Paying Living Expenses:    Food Insecurity:      Worried About Running Out of Food in the Last Year:      Ran Out of Food in the Last Year:    Transportation Needs:      Lack of Transportation (Medical):      Lack of Transportation (Non-Medical):    Physical Activity:      Days of Exercise per Week:      Minutes of Exercise per Session:   "  Stress:      Feeling of Stress :    Social Connections:      Frequency of Communication with Friends and Family:      Frequency of Social Gatherings with Friends and Family:      Attends Zoroastrianism Services:      Active Member of Clubs or Organizations:      Attends Club or Organization Meetings:      Marital Status:    Intimate Partner Violence:      Fear of Current or Ex-Partner:      Emotionally Abused:      Physically Abused:      Sexually Abused:        Outpatient Encounter Medications as of 8/25/2021   Medication Sig Dispense Refill     tretinoin (RETIN-A) 0.05 % external cream Apply pea sized amount at bedtime. 20 g 4     aspirin 81 MG tablet Take 1 tablet (81 mg) by mouth daily 30 tablet      Cholecalciferol (VITAMIN D-3) 25 MCG (1000 UT) CAPS Take 3,000 Units by mouth daily       IBUPROFEN PO        lisinopril (ZESTRIL) 10 MG tablet TAKE 1 TABLET BY MOUTH EVERY DAY 90 tablet 1     metFORMIN (GLUCOPHAGE) 500 MG tablet Take 2 tablets (1,000 mg) by mouth 2 times daily (with meals) TAKE 1 TABLET BY MOUTH TWICE A DAY WITH MEALS 360 tablet 0     Multiple Vitamins-Minerals (DRY EYE FORMULA PO) Omega 3       MULTIVITAMIN TABS   OR 1 TABLET DAILY       NEW MED Keraviatin Hair and scalp supplement. 2 daily       order for DME Equipment being ordered: Left Knee Brace  Left knee Osteoarthritis, Left Knee pain. 1 Units 0     rosuvastatin (CRESTOR) 40 MG tablet Take 1 tablet (40 mg) by mouth daily 90 tablet 1     No facility-administered encounter medications on file as of 8/25/2021.             O:   NAD, WDWN, Alert & Oriented, Mood & Affect wnl, Vitals stable   Here today alone   /76   Pulse 77   Ht 1.753 m (5' 9\")   Wt 86.6 kg (191 lb)   BMI 28.21 kg/m     General appearance normal   Vitals stable   Alert, oriented and in no acute distress  Yellow lobulated papules, white papules on forehead     Eyes: Conjunctivae/lids:Normal     ENT: Lips: normal    MSK:Normal    Pulm: Breathing Normal    Neuro/Psych: " Orientation:Alert and Orientedx3 ; Mood/Affect:normal   A/P:  1. Sebaceous hyperplasia and milia on forehead  Apply tretinoin at bedtime, pea sized amount at bedtime.     Discussed electrocautery to treat spots.   Use sunscreen and moisturizers.

## 2021-08-25 NOTE — NURSING NOTE
"Chief Complaint   Patient presents with     Derm Problem     Rash on forehead       Vitals:    08/25/21 1308   BP: 124/76   Pulse: 77   Weight: 86.6 kg (191 lb)   Height: 1.753 m (5' 9\")     Wt Readings from Last 1 Encounters:   08/25/21 86.6 kg (191 lb)     Ht Readings from Last 1 Encounters:   08/25/21 1.753 m (5' 9\")       Nicole Trimble CMA 8/25/2021 1:10 PM  "

## 2021-08-25 NOTE — LETTER
8/25/2021         RE: Livia Gill  [address] [facility] [id number]        Dear Colleague,    Thank you for referring your patient, Livia Gill, to the [facility]. Please see a copy of my visit note below.    Livia Gill is an extremely pleasant 66 year old year old female patient here today for bumps on forehead. Present for years. No pain or bleeding. Patient has no other skin complaints today.  Remainder of the HPI, Meds, PMH, Allergies, FH, and SH was reviewed in chart.    Past Medical History:   Diagnosis Date     Colon polyps 12/2017    screen every 5 years      Hypercholesterolemia      Hyperglycemia      Metabolic syndrome      Obesity      Vitamin D deficiency        Past Surgical History:   Procedure Laterality Date     CATARACT IOL, RT/LT Right 1/2015    right cataract      COLONOSCOPY  00, 06, 12, 17    polyps, every 5 years     D & C  1989     HC FEMUR/KNEE SURG UNLISTED      Lt. knee dislocation     TONSILLECTOMY & ADENOIDECTOMY      age 14     ZZC NONSPECIFIC PROCEDURE  1989    laparscopy for infertility        Family History   Problem Relation Age of Onset     Diabetes Father      Other Cancer Sister 61        Ovarian Cancer       Social History     Socioeconomic History     Marital status:      Spouse name: Not on file     Number of children: Not on file     Years of education: Not on file     Highest education level: Not on file   Occupational History     Not on file   Tobacco Use     Smoking status: Never Smoker     Smokeless tobacco: Never Used   Substance and Sexual Activity     Alcohol use: No     Drug use: No     Sexual activity: Never   Other Topics Concern     Parent/sibling w/ CABG, MI or angioplasty before 65F 55M? No   Social History Narrative     Not on file     Social Determinants of Health     Financial Resource Strain:      Difficulty of Paying Living Expenses:    Food Insecurity:      Worried About Running Out of Food in  "the Last Year:      Ran Out of Food in the Last Year:    Transportation Needs:      Lack of Transportation (Medical):      Lack of Transportation (Non-Medical):    Physical Activity:      Days of Exercise per Week:      Minutes of Exercise per Session:    Stress:      Feeling of Stress :    Social Connections:      Frequency of Communication with Friends and Family:      Frequency of Social Gatherings with Friends and Family:      Attends Confucianist Services:      Active Member of Clubs or Organizations:      Attends Club or Organization Meetings:      Marital Status:    Intimate Partner Violence:      Fear of Current or Ex-Partner:      Emotionally Abused:      Physically Abused:      Sexually Abused:        Outpatient Encounter Medications as of 8/25/2021   Medication Sig Dispense Refill     tretinoin (RETIN-A) 0.05 % external cream Apply pea sized amount at bedtime. 20 g 4     aspirin 81 MG tablet Take 1 tablet (81 mg) by mouth daily 30 tablet      Cholecalciferol (VITAMIN D-3) 25 MCG (1000 UT) CAPS Take 3,000 Units by mouth daily       IBUPROFEN PO        lisinopril (ZESTRIL) 10 MG tablet TAKE 1 TABLET BY MOUTH EVERY DAY 90 tablet 1     metFORMIN (GLUCOPHAGE) 500 MG tablet Take 2 tablets (1,000 mg) by mouth 2 times daily (with meals) TAKE 1 TABLET BY MOUTH TWICE A DAY WITH MEALS 360 tablet 0     Multiple Vitamins-Minerals (DRY EYE FORMULA PO) Omega 3       MULTIVITAMIN TABS   OR 1 TABLET DAILY       NEW MED Keraviatin Hair and scalp supplement. 2 daily       order for DME Equipment being ordered: Left Knee Brace  Left knee Osteoarthritis, Left Knee pain. 1 Units 0     rosuvastatin (CRESTOR) 40 MG tablet Take 1 tablet (40 mg) by mouth daily 90 tablet 1     No facility-administered encounter medications on file as of 8/25/2021.             O:   NAD, WDWN, Alert & Oriented, Mood & Affect wnl, Vitals stable   Here today alone   /76   Pulse 77   Ht 1.753 m (5' 9\")   Wt 86.6 kg (191 lb)   BMI 28.21 kg/m  "    General appearance normal   Vitals stable   Alert, oriented and in no acute distress  Yellow lobulated papules, white papules on forehead     Eyes: Conjunctivae/lids:Normal     ENT: Lips: normal    MSK:Normal    Pulm: Breathing Normal    Neuro/Psych: Orientation:Alert and Orientedx3 ; Mood/Affect:normal   A/P:  1. Sebaceous hyperplasia and milia on forehead  Apply tretinoin at bedtime, pea sized amount at bedtime.     Discussed electrocautery to treat spots.   Use sunscreen and moisturizers.             Again, thank you for allowing me to participate in the care of your patient.        Sincerely,        Janneth Benito PA-C

## 2021-09-26 ENCOUNTER — HEALTH MAINTENANCE LETTER (OUTPATIENT)
Age: 66
End: 2021-09-26

## 2021-10-08 ENCOUNTER — OFFICE VISIT (OUTPATIENT)
Dept: FAMILY MEDICINE | Facility: CLINIC | Age: 66
End: 2021-10-08
Payer: COMMERCIAL

## 2021-10-08 VITALS
SYSTOLIC BLOOD PRESSURE: 117 MMHG | OXYGEN SATURATION: 97 % | WEIGHT: 190 LBS | HEART RATE: 85 BPM | HEIGHT: 64 IN | DIASTOLIC BLOOD PRESSURE: 74 MMHG | BODY MASS INDEX: 32.44 KG/M2 | TEMPERATURE: 98.7 F

## 2021-10-08 DIAGNOSIS — Z23 NEED FOR PROPHYLACTIC VACCINATION AND INOCULATION AGAINST INFLUENZA: ICD-10-CM

## 2021-10-08 DIAGNOSIS — M67.471 GANGLION CYST OF RIGHT FOOT: ICD-10-CM

## 2021-10-08 DIAGNOSIS — E11.9 TYPE 2 DIABETES MELLITUS WITHOUT COMPLICATION, WITHOUT LONG-TERM CURRENT USE OF INSULIN (H): Primary | ICD-10-CM

## 2021-10-08 LAB
ANION GAP SERPL CALCULATED.3IONS-SCNC: 6 MMOL/L (ref 3–14)
BUN SERPL-MCNC: 14 MG/DL (ref 7–30)
CALCIUM SERPL-MCNC: 9.9 MG/DL (ref 8.5–10.1)
CHLORIDE BLD-SCNC: 103 MMOL/L (ref 94–109)
CO2 SERPL-SCNC: 28 MMOL/L (ref 20–32)
CREAT SERPL-MCNC: 1.04 MG/DL (ref 0.52–1.04)
CREAT UR-MCNC: 100 MG/DL
GFR SERPL CREATININE-BSD FRML MDRD: 55 ML/MIN/1.73M2
GLUCOSE BLD-MCNC: 147 MG/DL (ref 70–99)
HBA1C MFR BLD: 7 % (ref 0–5.6)
MICROALBUMIN UR-MCNC: 12 MG/L
MICROALBUMIN/CREAT UR: 12 MG/G CR (ref 0–25)
POTASSIUM BLD-SCNC: 4.7 MMOL/L (ref 3.4–5.3)
SODIUM SERPL-SCNC: 137 MMOL/L (ref 133–144)

## 2021-10-08 PROCEDURE — 80048 BASIC METABOLIC PNL TOTAL CA: CPT | Performed by: NURSE PRACTITIONER

## 2021-10-08 PROCEDURE — 36415 COLL VENOUS BLD VENIPUNCTURE: CPT | Performed by: NURSE PRACTITIONER

## 2021-10-08 PROCEDURE — 82043 UR ALBUMIN QUANTITATIVE: CPT | Performed by: NURSE PRACTITIONER

## 2021-10-08 PROCEDURE — 99213 OFFICE O/P EST LOW 20 MIN: CPT | Mod: 25 | Performed by: NURSE PRACTITIONER

## 2021-10-08 PROCEDURE — 90662 IIV NO PRSV INCREASED AG IM: CPT | Performed by: NURSE PRACTITIONER

## 2021-10-08 PROCEDURE — 90471 IMMUNIZATION ADMIN: CPT | Performed by: NURSE PRACTITIONER

## 2021-10-08 PROCEDURE — 83036 HEMOGLOBIN GLYCOSYLATED A1C: CPT | Performed by: NURSE PRACTITIONER

## 2021-10-08 RX ORDER — LISINOPRIL 10 MG/1
TABLET ORAL
Qty: 90 TABLET | Refills: 1 | Status: SHIPPED | OUTPATIENT
Start: 2021-10-08 | End: 2022-04-12

## 2021-10-08 ASSESSMENT — MIFFLIN-ST. JEOR: SCORE: 1377.08

## 2021-10-08 NOTE — PROGRESS NOTES
Assessment & Plan     Type 2 diabetes mellitus without complication, without long-term current use of insulin (H)  Await HgbA1C results.  Consider jardiance or januvia as a next step if HgbA1C remains high.  - OPTOMETRY REFERRAL; Future  - BASIC METABOLIC PANEL; Future  - Albumin Random Urine Quantitative with Creat Ratio; Future  - Hemoglobin A1c; Future  - metFORMIN (GLUCOPHAGE) 500 MG tablet; Take 2 tablets (1,000 mg) by mouth 2 times daily (with meals) TAKE 1 TABLET BY MOUTH TWICE A DAY WITH MEALS  - lisinopril (ZESTRIL) 10 MG tablet; TAKE 1 TABLET BY MOUTH EVERY DAY  - Hemoglobin A1c  - Albumin Random Urine Quantitative with Creat Ratio  - BASIC METABOLIC PANEL    Ganglion cyst of right foot  Patient to monitor at this time.    Need for prophylactic vaccination and inoculation against influenza    - INFLUENZA, QUAD, HIGH DOSE, PF, 65YR + (FLUZONE HD)    Ordering of each unique test  Prescription drug management             Return in about 6 months (around 4/8/2022) for Diabetic Check.    ANN Handy [facility] JOSIAS Cota is a 66 year old who presents for the following health issues     HPI     Diabetes Follow-up    How often are you checking your blood sugar? Two times daily  Blood sugar testing frequency justification:  Adjustment of medication(s)  What time of day are you checking your blood sugars (select all that apply)?  Before and after meals  Have you had any blood sugars above 200?  Yes - 178-200 before breakfast, 179-183 2 hours after breakfast.  Have you had any blood sugars below 70?  No    What symptoms do you notice when your blood sugar is low?  None    What concerns do you have today about your diabetes? None     Do you have any of these symptoms? (Select all that apply)  Redness, sores, or blisters on feet    Have you had a diabetic eye exam in the last 12 months? Yes- Date of last eye exam: 12/28/20,  Location: [place] [facility]. Records  "scanned in epic 7/31/21        BP Readings from Last 2 Encounters:   10/08/21 117/74   08/25/21 124/76     Hemoglobin A1C (%)   Date Value   07/02/2021 7.8 (H)   10/13/2020 7.0 (H)     LDL Cholesterol Calculated (mg/dL)   Date Value   07/02/2021 <1   03/11/2020 17                 How many servings of fruits and vegetables do you eat daily?  2-3    On average, how many sweetened beverages do you drink each day (Examples: soda, juice, sweet tea, etc.  Do NOT count diet or artificially sweetened beverages)?   1    How many days per week do you exercise enough to make your heart beat faster? none    How many minutes a day do you exercise enough to make your heart beat faster? none    How many days per week do you miss taking your medication? 0    Patient notes pain to plantar right great toe beneath distal joint.  Pain started in the last week.  She denies injury.      Review of Systems   Constitutional, HEENT, cardiovascular, pulmonary, gi and gu systems are negative, except as otherwise noted.      Objective    /74   Pulse 85   Temp 98.7  F (37.1  C) (Oral)   Ht 1.626 m (5' 4.02\")   Wt 86.2 kg (190 lb)   SpO2 97%   BMI 32.60 kg/m    Body mass index is 32.6 kg/m .  Physical Exam   GENERAL: healthy, alert and no distress  RESP: lungs clear to auscultation - no rales, rhonchi or wheezes  CV: regular rate and rhythm, normal S1 S2, no S3 or S4, no murmur, click or rub, no peripheral edema and peripheral pulses strong  MS: small cyst palpation to plantar right great toe.                  "

## 2021-10-08 NOTE — RESULT ENCOUNTER NOTE
Dear Livia,    Your recent test results are attached.      Good 3 month blood sugar average.  Continue your current dose of metformin.    If you have any questions please feel free to contact [phone] or myself via [employer].    Sincerely,  Lissett Conklin, CNP

## 2021-10-11 NOTE — RESULT ENCOUNTER NOTE
Dear Livia,    Your recent test results are attached.      Stable kidney function.   No excess protein in the urine.      If you have any questions please feel free to contact [phone] or myself via [employer].    Sincerely,  Lissett Conklin, CNP

## 2021-11-16 ENCOUNTER — IMMUNIZATION (OUTPATIENT)
Dept: NURSING | Facility: CLINIC | Age: 66
End: 2021-11-16
Payer: COMMERCIAL

## 2021-11-16 PROCEDURE — 0004A PR COVID VAC PFIZER DIL RECON 30 MCG/0.3 ML IM: CPT

## 2021-11-16 PROCEDURE — 91300 PR COVID VAC PFIZER DIL RECON 30 MCG/0.3 ML IM: CPT

## 2021-12-31 ENCOUNTER — TRANSFERRED RECORDS (OUTPATIENT)
Dept: MULTI SPECIALTY CLINIC | Facility: CLINIC | Age: 66
End: 2021-12-31
Payer: COMMERCIAL

## 2021-12-31 LAB — RETINOPATHY: NEGATIVE

## 2022-01-20 ENCOUNTER — ANCILLARY PROCEDURE (OUTPATIENT)
Dept: MAMMOGRAPHY | Facility: CLINIC | Age: 67
End: 2022-01-20
Attending: NURSE PRACTITIONER
Payer: COMMERCIAL

## 2022-01-20 DIAGNOSIS — Z12.31 VISIT FOR SCREENING MAMMOGRAM: ICD-10-CM

## 2022-01-20 PROCEDURE — 77067 SCR MAMMO BI INCL CAD: CPT | Mod: TC | Performed by: RADIOLOGY

## 2022-01-21 NOTE — RESULT ENCOUNTER NOTE
Dear Livia,    Your recent test results are attached.      Normal mammogram.    If you have any questions please feel free to contact [phone] or myself via [employer].    Sincerely,  Lissett Conklin, CNP

## 2022-04-04 ENCOUNTER — TELEPHONE (OUTPATIENT)
Dept: FAMILY MEDICINE | Facility: CLINIC | Age: 67
End: 2022-04-04
Payer: COMMERCIAL

## 2022-04-04 NOTE — TELEPHONE ENCOUNTER
Reason for Call:  Other appointment    Detailed comments: Patient is asking if PCP could fit her in for biometric screening, glucose and cholesterol, and also medication check before provider leaves clinic ( patient's last physical was 07/02/21 and just needs biometric screening )  Also, patient would like to discuss recommendations for new PCP. Patient  can be available Tues and Thursday after 9:00 am. Declined virtual appointments.    Phone Number Patient can be reached at: Cell number on file:    Telephone Information:   Mobile [phone]       Best Time: anytime    Can we leave a detailed message on this number? YES    Call taken on 4/4/2022 at 2:08 PM by Aura Moran

## 2022-04-06 DIAGNOSIS — E78.5 HYPERLIPIDEMIA LDL GOAL <160: ICD-10-CM

## 2022-04-07 RX ORDER — ROSUVASTATIN CALCIUM 40 MG/1
TABLET, COATED ORAL
Qty: 90 TABLET | Refills: 0 | Status: SHIPPED | OUTPATIENT
Start: 2022-04-07 | End: 2022-04-12

## 2022-04-07 NOTE — TELEPHONE ENCOUNTER
Prescription approved per [facility] Refill Protocol. Patient has appointment with Lissett Conklin CNP scheduled for 4/12/22.   Malia Granado RN   [facility]

## 2022-04-12 ENCOUNTER — OFFICE VISIT (OUTPATIENT)
Dept: FAMILY MEDICINE | Facility: CLINIC | Age: 67
End: 2022-04-12
Payer: COMMERCIAL

## 2022-04-12 VITALS
TEMPERATURE: 98.8 F | OXYGEN SATURATION: 100 % | BODY MASS INDEX: 32.13 KG/M2 | SYSTOLIC BLOOD PRESSURE: 118 MMHG | WEIGHT: 188.2 LBS | DIASTOLIC BLOOD PRESSURE: 76 MMHG | HEIGHT: 64 IN | HEART RATE: 73 BPM

## 2022-04-12 DIAGNOSIS — Z23 HIGH PRIORITY FOR 2019-NCOV VACCINE: Primary | ICD-10-CM

## 2022-04-12 DIAGNOSIS — E11.9 TYPE 2 DIABETES MELLITUS WITHOUT COMPLICATION, WITHOUT LONG-TERM CURRENT USE OF INSULIN (H): ICD-10-CM

## 2022-04-12 DIAGNOSIS — E78.5 HYPERLIPIDEMIA LDL GOAL <160: ICD-10-CM

## 2022-04-12 DIAGNOSIS — K58.0 IRRITABLE BOWEL SYNDROME WITH DIARRHEA: ICD-10-CM

## 2022-04-12 LAB
CHOLEST SERPL-MCNC: 69 MG/DL
FASTING STATUS PATIENT QL REPORTED: YES
FASTING STATUS PATIENT QL REPORTED: YES
GLUCOSE BLD-MCNC: 144 MG/DL (ref 70–99)
HBA1C MFR BLD: 7.7 % (ref 0–5.6)
HDLC SERPL-MCNC: 36 MG/DL
LDLC SERPL CALC-MCNC: <5 MG/DL
NONHDLC SERPL-MCNC: 33 MG/DL
TRIGL SERPL-MCNC: 165 MG/DL

## 2022-04-12 PROCEDURE — 82947 ASSAY GLUCOSE BLOOD QUANT: CPT | Performed by: NURSE PRACTITIONER

## 2022-04-12 PROCEDURE — 99214 OFFICE O/P EST MOD 30 MIN: CPT | Mod: 25 | Performed by: NURSE PRACTITIONER

## 2022-04-12 PROCEDURE — 90732 PPSV23 VACC 2 YRS+ SUBQ/IM: CPT | Performed by: NURSE PRACTITIONER

## 2022-04-12 PROCEDURE — 80061 LIPID PANEL: CPT | Performed by: NURSE PRACTITIONER

## 2022-04-12 PROCEDURE — 36415 COLL VENOUS BLD VENIPUNCTURE: CPT | Performed by: NURSE PRACTITIONER

## 2022-04-12 PROCEDURE — 90471 IMMUNIZATION ADMIN: CPT | Performed by: NURSE PRACTITIONER

## 2022-04-12 PROCEDURE — 83036 HEMOGLOBIN GLYCOSYLATED A1C: CPT | Performed by: NURSE PRACTITIONER

## 2022-04-12 PROCEDURE — 91305 COVID-19,PF,PFIZER (12+ YRS): CPT | Performed by: NURSE PRACTITIONER

## 2022-04-12 PROCEDURE — 0054A COVID-19,PF,PFIZER (12+ YRS): CPT | Performed by: NURSE PRACTITIONER

## 2022-04-12 RX ORDER — ROSUVASTATIN CALCIUM 40 MG/1
40 TABLET, COATED ORAL DAILY
Qty: 90 TABLET | Refills: 3 | Status: SHIPPED | OUTPATIENT
Start: 2022-04-12 | End: 2022-10-25

## 2022-04-12 RX ORDER — LISINOPRIL 10 MG/1
TABLET ORAL
Qty: 90 TABLET | Refills: 1 | Status: SHIPPED | OUTPATIENT
Start: 2022-04-12 | End: 2022-10-25

## 2022-04-12 ASSESSMENT — PAIN SCALES - GENERAL: PAINLEVEL: NO PAIN (0)

## 2022-04-12 NOTE — RESULT ENCOUNTER NOTE
Dear Livia,    Your recent test results are attached.      Your HgbA1C is above goal.  Would you consider adding jardiance 10 mg daily to your regimen?  I think this will help lower your blood sugars, assist with weight loss and help prevent heard and vascular disease.  It can have side effects of yeast and bladder infections, but is generally well tolerated.  Let me know your thoughts.    If you have any questions please feel free to contact [phone] or myself via [employer].    Sincerely,  Lissett Conklin, CNP

## 2022-04-12 NOTE — PROGRESS NOTES
"  Assessment & Plan     High priority for 2019-nCoV vaccine    - COVID-19,PF,PFIZER (12+ Yrs GRAY LABEL)    Type 2 diabetes mellitus without complication, without long-term current use of insulin (H)  Await HgbA1C results.  - HEMOGLOBIN A1C; Future  - Glucose; Future  - lisinopril (ZESTRIL) 10 MG tablet; TAKE 1 TABLET BY MOUTH EVERY DAY  - metFORMIN (GLUCOPHAGE) 500 MG tablet; Take 2 tablets (1,000 mg) by mouth in the morning and 2 tablets (1,000 mg) in the evening. Take with meals. TAKE 1 TABLET BY MOUTH TWICE A DAY WITH MEALS  - Glucose  - HEMOGLOBIN A1C    Hyperlipidemia LDL goal <160  Stable.  Continue current treatment plan and medications.   - Lipid panel reflex to direct LDL Fasting; Future  - rosuvastatin (CRESTOR) 40 MG tablet; Take 1 tablet (40 mg) by mouth in the morning.  - Lipid panel reflex to direct LDL Fasting    Irritable bowel syndrome with diarrhea  Patient to trial immodium once daily.      Ordering of each unique test  Prescription drug management         BMI:   Estimated body mass index is 32.17 kg/m  as calculated from the following:    Height as of this encounter: 1.629 m (5' 4.13\").    Weight as of this encounter: 85.4 kg (188 lb 3.2 oz).           Return in about 6 months (around 10/12/2022) for Diabetic Check.    ANN Handy [facility] JOSIAS Cota is a 66 year old who presents for the following health issues     History of Present Illness       Reason for visit:  Biometric screening/advice  Symptoms include:  None    She eats 0-1 servings of fruits and vegetables daily.She consumes 1 sweetened beverage(s) daily.She exercises with enough effort to increase her heart rate 9 or less minutes per day.  She exercises with enough effort to increase her heart rate 3 or less days per week.   She is taking medications regularly.     Patient does not regularly check her blood sugars.  She denies low blood sugar symptoms or any concerns regarding " "diabetes.      Patient notes ongoing IBS with diarrhea after meals. She wonders what she can do for symptoms.      Review of Systems   Constitutional, HEENT, cardiovascular, pulmonary, gi and gu systems are negative, except as otherwise noted.      Objective    /76   Pulse 73   Temp 98.8  F (37.1  C) (Temporal)   Ht 1.629 m (5' 4.13\")   Wt 85.4 kg (188 lb 3.2 oz)   SpO2 100%   BMI 32.17 kg/m    Body mass index is 32.17 kg/m .  Physical Exam   GENERAL: healthy, alert and no distress  RESP: lungs clear to auscultation - no rales, rhonchi or wheezes  CV: regular rate and rhythm, normal S1 S2, no S3 or S4, no murmur, click or rub, no peripheral edema and peripheral pulses strong  MS: no gross musculoskeletal defects noted, no edema                "

## 2022-04-13 ENCOUNTER — TELEPHONE (OUTPATIENT)
Dept: INTERNAL MEDICINE | Facility: CLINIC | Age: 67
End: 2022-04-13
Payer: COMMERCIAL

## 2022-04-13 NOTE — RESULT ENCOUNTER NOTE
Dear Livia,    Your recent test results are attached.      Okay glucose.  Good cholesterol.    If you have any questions please feel free to contact [phone] or myself via [employer].    Sincerely,  Lissett Conklin, CNP

## 2022-04-13 NOTE — TELEPHONE ENCOUNTER
Physician Results Form (biometric screening) given to Lissett when patient was seen in clinic on 4-.    Form completed and signed and faxed back to [phone].  Copy mailed to patient per her request.    Uyen Smith,

## 2022-04-13 NOTE — TELEPHONE ENCOUNTER
Reason for Call:  Form, our goal is to have forms completed with 72 hours, however, some forms may require a visit or additional information.    Type of letter, form or note:  medical    Who is the form from?: Patient    Where did the form come from: Patient or family brought in       What clinic location was the form placed at?: [facility]    Where the form was placed: Given to physician    Call taken on 4/13/2022 at 12:53 PM by Uyen Smith

## 2022-08-27 ENCOUNTER — HEALTH MAINTENANCE LETTER (OUTPATIENT)
Age: 67
End: 2022-08-27

## 2022-10-25 ENCOUNTER — OFFICE VISIT (OUTPATIENT)
Dept: FAMILY MEDICINE | Facility: CLINIC | Age: 67
End: 2022-10-25
Payer: COMMERCIAL

## 2022-10-25 VITALS
WEIGHT: 186 LBS | TEMPERATURE: 97.9 F | HEART RATE: 68 BPM | OXYGEN SATURATION: 95 % | BODY MASS INDEX: 31.79 KG/M2 | DIASTOLIC BLOOD PRESSURE: 80 MMHG | SYSTOLIC BLOOD PRESSURE: 123 MMHG

## 2022-10-25 DIAGNOSIS — E78.5 HYPERLIPIDEMIA LDL GOAL <160: ICD-10-CM

## 2022-10-25 DIAGNOSIS — K42.9 UMBILICAL HERNIA WITHOUT OBSTRUCTION AND WITHOUT GANGRENE: ICD-10-CM

## 2022-10-25 DIAGNOSIS — Z23 HIGH PRIORITY FOR 2019-NCOV VACCINE: ICD-10-CM

## 2022-10-25 DIAGNOSIS — Z23 NEED FOR PROPHYLACTIC VACCINATION AND INOCULATION AGAINST INFLUENZA: ICD-10-CM

## 2022-10-25 DIAGNOSIS — E11.9 TYPE 2 DIABETES MELLITUS WITHOUT COMPLICATION, WITHOUT LONG-TERM CURRENT USE OF INSULIN (H): Primary | ICD-10-CM

## 2022-10-25 DIAGNOSIS — N18.2 CHRONIC KIDNEY DISEASE, STAGE 2 (MILD): ICD-10-CM

## 2022-10-25 LAB
ANION GAP SERPL CALCULATED.3IONS-SCNC: 5 MMOL/L (ref 3–14)
BUN SERPL-MCNC: 21 MG/DL (ref 7–30)
CALCIUM SERPL-MCNC: 10 MG/DL (ref 8.5–10.1)
CHLORIDE BLD-SCNC: 104 MMOL/L (ref 94–109)
CO2 SERPL-SCNC: 29 MMOL/L (ref 20–32)
CREAT SERPL-MCNC: 0.91 MG/DL (ref 0.52–1.04)
GFR SERPL CREATININE-BSD FRML MDRD: 68 ML/MIN/1.73M2
GLUCOSE BLD-MCNC: 141 MG/DL (ref 70–99)
HBA1C MFR BLD: 7.8 % (ref 0–5.6)
POTASSIUM BLD-SCNC: 4.4 MMOL/L (ref 3.4–5.3)
SODIUM SERPL-SCNC: 138 MMOL/L (ref 133–144)

## 2022-10-25 PROCEDURE — 90471 IMMUNIZATION ADMIN: CPT | Performed by: PHYSICIAN ASSISTANT

## 2022-10-25 PROCEDURE — 91312 COVID-19,PF,PFIZER BOOSTER BIVALENT: CPT | Performed by: PHYSICIAN ASSISTANT

## 2022-10-25 PROCEDURE — 82043 UR ALBUMIN QUANTITATIVE: CPT | Performed by: PHYSICIAN ASSISTANT

## 2022-10-25 PROCEDURE — 90662 IIV NO PRSV INCREASED AG IM: CPT | Performed by: PHYSICIAN ASSISTANT

## 2022-10-25 PROCEDURE — 36415 COLL VENOUS BLD VENIPUNCTURE: CPT | Performed by: PHYSICIAN ASSISTANT

## 2022-10-25 PROCEDURE — 99214 OFFICE O/P EST MOD 30 MIN: CPT | Mod: 25 | Performed by: PHYSICIAN ASSISTANT

## 2022-10-25 PROCEDURE — 80048 BASIC METABOLIC PNL TOTAL CA: CPT | Performed by: PHYSICIAN ASSISTANT

## 2022-10-25 PROCEDURE — 99207 PR FOOT EXAM NO CHARGE: CPT | Performed by: PHYSICIAN ASSISTANT

## 2022-10-25 PROCEDURE — 0124A COVID-19,PF,PFIZER BOOSTER BIVALENT: CPT | Performed by: PHYSICIAN ASSISTANT

## 2022-10-25 PROCEDURE — 83036 HEMOGLOBIN GLYCOSYLATED A1C: CPT | Performed by: PHYSICIAN ASSISTANT

## 2022-10-25 RX ORDER — LISINOPRIL 10 MG/1
TABLET ORAL
Qty: 90 TABLET | Refills: 1 | Status: SHIPPED | OUTPATIENT
Start: 2022-10-25 | End: 2023-05-15

## 2022-10-25 RX ORDER — ROSUVASTATIN CALCIUM 40 MG/1
40 TABLET, COATED ORAL DAILY
Qty: 90 TABLET | Refills: 3 | Status: SHIPPED | OUTPATIENT
Start: 2022-10-25 | End: 2023-07-25

## 2022-10-25 NOTE — PROGRESS NOTES
"  Assessment & Plan     Type 2 diabetes mellitus without complication, without long-term current use of insulin (H)  Has been a little higher than goal.  Discussed jardiance with last provider.  Also would be open to seeing DE or starting an injectable   - BASIC METABOLIC PANEL; Future  - Albumin Random Urine Quantitative with Creat Ratio; Future  - HEMOGLOBIN A1C; Future  - lisinopril (ZESTRIL) 10 MG tablet; TAKE 1 TABLET BY MOUTH EVERY DAY  - metFORMIN (GLUCOPHAGE) 500 MG tablet; Take 2 tablets (1,000 mg) by mouth 2 times daily (with meals) TAKE 1 TABLET BY MOUTH TWICE A DAY WITH MEALS  - UA Macro with Reflex to Micro and Culture - lab collect; Future  - FOOT EXAM  - BASIC METABOLIC PANEL  - Albumin Random Urine Quantitative with Creat Ratio  - HEMOGLOBIN A1C  - UA Macro with Reflex to Micro and Culture - lab collect    Hyperlipidemia LDL goal <160    - rosuvastatin (CRESTOR) 40 MG tablet; Take 1 tablet (40 mg) by mouth daily    Chronic kidney disease, stage 2 (mild)  Follow up as needed  - UA Macro with Reflex to Micro and Culture - lab collect; Future  - UA Macro with Reflex to Micro and Culture - lab collect    Umbilical hernia without obstruction and without gangrene    - Adult General Surg Referral; Future    Need for prophylactic vaccination and inoculation against influenza    - INFLUENZA, QUAD, HIGH DOSE, PF, 65YR + (FLUZONE HD)    High priority for 2019-nCoV vaccine    - COVID-19,PF,PFIZER BOOSTER BIVALENT 12+Yrs             BMI:   Estimated body mass index is 31.79 kg/m  as calculated from the following:    Height as of 4/12/22: 1.629 m (5' 4.13\").    Weight as of this encounter: 84.4 kg (186 lb).   Weight management plan: did not discuss in detail        Return in about 6 months (around 4/25/2023) for diabetes.    Bryanna Narvaez PA-C  [facility] JOSIAS Cota is a 67 year old accompanied by her self , presenting for the following health issues:  Diabetes      History " of Present Illness       Diabetes:   She presents for follow up of diabetes.  She is checking home blood glucose a few times a month. She checks blood glucose before meals and after meals.  Blood glucose is never over 200 and never under 70. When her blood glucose is low, the patient is asymptomatic for confusion, blurred vision, lethargy and reports not feeling dizzy, shaky, or weak.  She has no concerns regarding her diabetes at this time.  She is having blurry vision.         Reason for visit:  A1c and hernia check    She eats 0-1 servings of fruits and vegetables daily.She consumes 1 sweetened beverage(s) daily.She exercises with enough effort to increase her heart rate 9 or less minutes per day.  She exercises with enough effort to increase her heart rate 3 or less days per week. She is missing 1 dose(s) of medications per week.     Fasting sugars are high.  Not sure why higher in the am.  Dinner is main meal but doesn't eat late at night.  Sometimes has snack.  No middle of the night lows that she knows of.   Was thinking jardiance if a1c is high this time.      Abdominal hernia discussion -getting bigger   Comes out with BM            Review of Systems   As above      Objective    /80   Pulse 68   Temp 97.9  F (36.6  C) (Oral)   Wt 84.4 kg (186 lb)   SpO2 95%   BMI 31.79 kg/m    Body mass index is 31.79 kg/m .  Physical Exam  Constitutional:       General: She is not in acute distress.  Cardiovascular:      Rate and Rhythm: Normal rate and regular rhythm.      Pulses:           Dorsalis pedis pulses are 2+ on the right side and 2+ on the left side.        Posterior tibial pulses are 1+ on the right side and 1+ on the left side.   Pulmonary:      Effort: Pulmonary effort is normal.      Breath sounds: Normal breath sounds.   Abdominal:      General: Bowel sounds are normal.      Hernia: A hernia (umblical ) is present.   Musculoskeletal:      Right lower leg: No edema.      Left lower leg: No edema.    Feet:      Right foot:      Protective Sensation: 6 sites tested. 6 sites sensed.      Left foot:      Protective Sensation: 6 sites tested.   Neurological:      Mental Status: She is alert.

## 2022-10-26 LAB
CREAT UR-MCNC: 170 MG/DL
MICROALBUMIN UR-MCNC: 17 MG/L
MICROALBUMIN/CREAT UR: 10 MG/G CR (ref 0–25)

## 2023-02-03 ENCOUNTER — ANCILLARY ORDERS (OUTPATIENT)
Dept: FAMILY MEDICINE | Facility: CLINIC | Age: 68
End: 2023-02-03

## 2023-02-03 DIAGNOSIS — Z12.31 VISIT FOR SCREENING MAMMOGRAM: ICD-10-CM

## 2023-03-01 ENCOUNTER — ANCILLARY PROCEDURE (OUTPATIENT)
Dept: MAMMOGRAPHY | Facility: CLINIC | Age: 68
End: 2023-03-01
Attending: PHYSICIAN ASSISTANT
Payer: COMMERCIAL

## 2023-03-01 DIAGNOSIS — Z12.31 VISIT FOR SCREENING MAMMOGRAM: ICD-10-CM

## 2023-03-01 PROCEDURE — 77067 SCR MAMMO BI INCL CAD: CPT | Mod: TC | Performed by: RADIOLOGY

## 2023-03-29 DIAGNOSIS — N18.2 CHRONIC KIDNEY DISEASE, STAGE 2 (MILD): ICD-10-CM

## 2023-03-29 DIAGNOSIS — E11.9 TYPE 2 DIABETES MELLITUS WITHOUT COMPLICATION, WITHOUT LONG-TERM CURRENT USE OF INSULIN (H): ICD-10-CM

## 2023-03-29 RX ORDER — EMPAGLIFLOZIN 10 MG/1
TABLET, FILM COATED ORAL
Qty: 90 TABLET | Refills: 0 | Status: SHIPPED | OUTPATIENT
Start: 2023-03-29 | End: 2023-07-03

## 2023-05-13 DIAGNOSIS — E11.9 TYPE 2 DIABETES MELLITUS WITHOUT COMPLICATION, WITHOUT LONG-TERM CURRENT USE OF INSULIN (H): ICD-10-CM

## 2023-05-15 RX ORDER — LISINOPRIL 10 MG/1
TABLET ORAL
Qty: 90 TABLET | Refills: 1 | Status: SHIPPED | OUTPATIENT
Start: 2023-05-15 | End: 2023-07-25

## 2023-06-02 ENCOUNTER — HEALTH MAINTENANCE LETTER (OUTPATIENT)
Age: 68
End: 2023-06-02

## 2023-06-02 DIAGNOSIS — E11.9 TYPE 2 DIABETES MELLITUS WITHOUT COMPLICATION, WITHOUT LONG-TERM CURRENT USE OF INSULIN (H): ICD-10-CM

## 2023-07-25 ENCOUNTER — OFFICE VISIT (OUTPATIENT)
Dept: FAMILY MEDICINE | Facility: CLINIC | Age: 68
End: 2023-07-25
Payer: COMMERCIAL

## 2023-07-25 VITALS
SYSTOLIC BLOOD PRESSURE: 107 MMHG | DIASTOLIC BLOOD PRESSURE: 70 MMHG | WEIGHT: 178 LBS | OXYGEN SATURATION: 97 % | TEMPERATURE: 97.7 F | RESPIRATION RATE: 19 BRPM | HEART RATE: 91 BPM | BODY MASS INDEX: 30.43 KG/M2

## 2023-07-25 DIAGNOSIS — E78.5 HYPERLIPIDEMIA LDL GOAL <160: ICD-10-CM

## 2023-07-25 DIAGNOSIS — N18.2 TYPE 2 DIABETES MELLITUS WITH STAGE 2 CHRONIC KIDNEY DISEASE, WITHOUT LONG-TERM CURRENT USE OF INSULIN (H): ICD-10-CM

## 2023-07-25 DIAGNOSIS — E11.9 TYPE 2 DIABETES MELLITUS WITHOUT COMPLICATION, WITHOUT LONG-TERM CURRENT USE OF INSULIN (H): ICD-10-CM

## 2023-07-25 DIAGNOSIS — R63.4 WEIGHT LOSS: ICD-10-CM

## 2023-07-25 DIAGNOSIS — D58.2 ELEVATED HEMOGLOBIN (H): ICD-10-CM

## 2023-07-25 DIAGNOSIS — N18.2 CHRONIC KIDNEY DISEASE, STAGE 2 (MILD): Primary | ICD-10-CM

## 2023-07-25 DIAGNOSIS — E11.22 TYPE 2 DIABETES MELLITUS WITH STAGE 2 CHRONIC KIDNEY DISEASE, WITHOUT LONG-TERM CURRENT USE OF INSULIN (H): ICD-10-CM

## 2023-07-25 DIAGNOSIS — L65.9 HAIR LOSS: ICD-10-CM

## 2023-07-25 DIAGNOSIS — Z12.11 SCREEN FOR COLON CANCER: ICD-10-CM

## 2023-07-25 LAB
ALBUMIN UR-MCNC: NEGATIVE MG/DL
APPEARANCE UR: CLEAR
BASOPHILS # BLD AUTO: 0 10E3/UL (ref 0–0.2)
BASOPHILS NFR BLD AUTO: 1 %
BILIRUB UR QL STRIP: NEGATIVE
COLOR UR AUTO: YELLOW
EOSINOPHIL # BLD AUTO: 0.1 10E3/UL (ref 0–0.7)
EOSINOPHIL NFR BLD AUTO: 2 %
ERYTHROCYTE [DISTWIDTH] IN BLOOD BY AUTOMATED COUNT: 13.4 % (ref 10–15)
GLUCOSE UR STRIP-MCNC: >=1000 MG/DL
HBA1C MFR BLD: 7.2 % (ref 0–5.6)
HCT VFR BLD AUTO: 52.3 % (ref 35–47)
HGB BLD-MCNC: 16.9 G/DL (ref 11.7–15.7)
HGB UR QL STRIP: NEGATIVE
IMM GRANULOCYTES # BLD: 0 10E3/UL
IMM GRANULOCYTES NFR BLD: 0 %
KETONES UR STRIP-MCNC: 15 MG/DL
LEUKOCYTE ESTERASE UR QL STRIP: NEGATIVE
LYMPHOCYTES # BLD AUTO: 2.5 10E3/UL (ref 0.8–5.3)
LYMPHOCYTES NFR BLD AUTO: 31 %
MCH RBC QN AUTO: 30.5 PG (ref 26.5–33)
MCHC RBC AUTO-ENTMCNC: 32.3 G/DL (ref 31.5–36.5)
MCV RBC AUTO: 94 FL (ref 78–100)
MONOCYTES # BLD AUTO: 0.5 10E3/UL (ref 0–1.3)
MONOCYTES NFR BLD AUTO: 7 %
NEUTROPHILS # BLD AUTO: 4.8 10E3/UL (ref 1.6–8.3)
NEUTROPHILS NFR BLD AUTO: 60 %
NITRATE UR QL: NEGATIVE
PH UR STRIP: 5.5 [PH] (ref 5–7)
PLATELET # BLD AUTO: 228 10E3/UL (ref 150–450)
RBC # BLD AUTO: 5.55 10E6/UL (ref 3.8–5.2)
SP GR UR STRIP: 1.01 (ref 1–1.03)
UROBILINOGEN UR STRIP-ACNC: 0.2 E.U./DL
WBC # BLD AUTO: 8 10E3/UL (ref 4–11)

## 2023-07-25 PROCEDURE — 80061 LIPID PANEL: CPT | Performed by: PHYSICIAN ASSISTANT

## 2023-07-25 PROCEDURE — 83036 HEMOGLOBIN GLYCOSYLATED A1C: CPT | Performed by: PHYSICIAN ASSISTANT

## 2023-07-25 PROCEDURE — 36415 COLL VENOUS BLD VENIPUNCTURE: CPT | Performed by: PHYSICIAN ASSISTANT

## 2023-07-25 PROCEDURE — 81003 URINALYSIS AUTO W/O SCOPE: CPT | Performed by: PHYSICIAN ASSISTANT

## 2023-07-25 PROCEDURE — 99214 OFFICE O/P EST MOD 30 MIN: CPT | Performed by: PHYSICIAN ASSISTANT

## 2023-07-25 PROCEDURE — 85025 COMPLETE CBC W/AUTO DIFF WBC: CPT | Performed by: PHYSICIAN ASSISTANT

## 2023-07-25 RX ORDER — LISINOPRIL 10 MG/1
10 TABLET ORAL DAILY
Qty: 90 TABLET | Refills: 3 | Status: SHIPPED | OUTPATIENT
Start: 2023-07-25

## 2023-07-25 RX ORDER — ROSUVASTATIN CALCIUM 40 MG/1
40 TABLET, COATED ORAL DAILY
Qty: 90 TABLET | Refills: 3 | Status: SHIPPED | OUTPATIENT
Start: 2023-07-25

## 2023-07-25 NOTE — PROGRESS NOTES
Assessment & Plan     Chronic kidney disease, stage 2 (mild)  Follow up as needed when labs are back   - UA Macroscopic with reflex to Microscopic and Culture; Future  - empagliflozin (JARDIANCE) 10 MG TABS tablet; Take 1 tablet (10 mg) by mouth daily  - UA Macroscopic with reflex to Microscopic and Culture    Screen for colon cancer    - Colonoscopy Screening  Referral; Future    Type 2 diabetes mellitus without complication, without long-term current use of insulin (H)  As above  - Lipid panel reflex to direct LDL Non-fasting; Future  - HEMOGLOBIN A1C; Future  - empagliflozin (JARDIANCE) 10 MG TABS tablet; Take 1 tablet (10 mg) by mouth daily  - lisinopril (ZESTRIL) 10 MG tablet; Take 1 tablet (10 mg) by mouth daily  - metFORMIN (GLUCOPHAGE) 500 MG tablet; TAKE 2 TABLETS BY MOUTH TWICE A DAY AFTER MEALS  - Lipid panel reflex to direct LDL Non-fasting  - HEMOGLOBIN A1C    Type 2 diabetes mellitus with stage 2 chronic kidney disease, without long-term current use of insulin (H)  As above    Hair loss   Long standing     Hyperlipidemia LDL goal <160  As above  - rosuvastatin (CRESTOR) 40 MG tablet; Take 1 tablet (40 mg) by mouth daily    Weight loss  Suspect due to meds and aging.  Otherwise feels fine.  Follow up when labs are back   - CBC with platelets and differential; Future  - CBC with platelets and differential                 Bryanna Narvaez PA-C  [facility] AGUSTINYANNICK Cota is a 68 year old, presenting for the following health issues:  Diabetes        7/25/2023     2:27 PM   Additional Questions   Roomed by Luna   Accompanied by self         7/25/2023     2:27 PM   Patient Reported Additional Medications   Patient reports taking the following new medications Nutrafol 4 tablets daily     HPI     Diabetes Follow-up    How often are you checking your blood sugar? A few times a week  What time of day are you checking your blood sugars (select all that apply)?  Before  and after meals  Have you had any blood sugars above 200?  No  Have you had any blood sugars below 70?  No  What symptoms do you notice when your blood sugar is low?  None  What concerns do you have today about your diabetes? None   Do you have any of these symptoms? (Select all that apply)  No numbness or tingling in feet.  No redness, sores or blisters on feet.  No complaints of excessive thirst.  No reports of blurry vision.  No significant changes to weight.  Have you had a diabetic eye exam in the last 12 months? [facility] Jan 16, 2023-normal    Tolerating jardiance.  Was started a few months ago.    Having less appetite but no stomach pain.  Energy is fine.    Retiring in Dec.  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      Hair loss x years.  Started with stress.  Never saw derm.    BP Readings from Last 2 Encounters:   07/25/23 107/70   10/25/22 123/80     Hemoglobin A1C (%)   Date Value   10/25/2022 7.8 (H)   04/12/2022 7.7 (H)   07/02/2021 7.8 (H)   10/13/2020 7.0 (H)     LDL Cholesterol Calculated (mg/dL)   Date Value   04/12/2022 <5   07/02/2021 <1   03/11/2020 17         How many servings of fruits and vegetables do you eat daily?  4 or more  On average, how many sweetened beverages do you drink each day (Examples: soda, juice, sweet tea, etc.  Do NOT count diet or artificially sweetened beverages)?   0  How many days per week do you exercise enough to make your heart beat faster? None   How many minutes a day do you exercise enough to make your heart beat faster? None   How many days per week do you miss taking your medication? 0        Review of Systems   As above      Objective    /70   Pulse 91   Temp 97.7  F (36.5  C) (Oral)   Resp 19   Wt 80.7 kg (178 lb)   SpO2 97%   BMI 30.43 kg/m    Body mass index is 30.43 kg/m .  Physical Exam  Constitutional:       General: She is not in acute distress.  Cardiovascular:      Rate and Rhythm: Normal rate and regular rhythm.   Pulmonary:       Effort: Pulmonary effort is normal.      Breath sounds: Normal breath sounds.   Neurological:      Mental Status: She is alert.

## 2023-07-25 NOTE — PROGRESS NOTES
{PROVIDER CHARTING PREFERENCE:092005}    Subjective   Cipriano is a 68 year old, presenting for the following health issues:  Diabetes      7/25/2023     2:23 PM   Additional Questions   Roomed by Luna   Accompanied by self     HPI     {SUPERLIST (Optional):901159}  {additonal problems for provider to add (Optional):311852}      Review of Systems   {ROS COMP (Optional):096223}      Objective    There were no vitals taken for this visit.  There is no height or weight on file to calculate BMI.  Physical Exam   {Exam List (Optional):130900}    {Diagnostic Test Results (Optional):155223}    {AMBULATORY ATTESTATION (Optional):856065}

## 2023-07-26 LAB
CHOLEST SERPL-MCNC: 70 MG/DL
HDLC SERPL-MCNC: 30 MG/DL
LDLC SERPL CALC-MCNC: <4 MG/DL
NONHDLC SERPL-MCNC: 40 MG/DL
TRIGL SERPL-MCNC: 265 MG/DL

## 2023-09-23 ENCOUNTER — HEALTH MAINTENANCE LETTER (OUTPATIENT)
Age: 68
End: 2023-09-23

## 2024-02-10 ENCOUNTER — HEALTH MAINTENANCE LETTER (OUTPATIENT)
Age: 69
End: 2024-02-10

## 2024-02-27 DIAGNOSIS — E11.9 TYPE 2 DIABETES MELLITUS WITHOUT COMPLICATION, WITHOUT LONG-TERM CURRENT USE OF INSULIN (H): ICD-10-CM

## 2024-03-22 DIAGNOSIS — N18.2 CHRONIC KIDNEY DISEASE, STAGE 2 (MILD): ICD-10-CM

## 2024-03-22 DIAGNOSIS — E11.9 TYPE 2 DIABETES MELLITUS WITHOUT COMPLICATION, WITHOUT LONG-TERM CURRENT USE OF INSULIN (H): ICD-10-CM

## 2024-03-22 RX ORDER — EMPAGLIFLOZIN 10 MG/1
10 TABLET, FILM COATED ORAL DAILY
Qty: 90 TABLET | Refills: 0 | Status: SHIPPED | OUTPATIENT
Start: 2024-03-22

## 2024-06-29 ENCOUNTER — HEALTH MAINTENANCE LETTER (OUTPATIENT)
Age: 69
End: 2024-06-29

## 2024-11-16 ENCOUNTER — HEALTH MAINTENANCE LETTER (OUTPATIENT)
Age: 69
End: 2024-11-16

## 2025-03-08 ENCOUNTER — HEALTH MAINTENANCE LETTER (OUTPATIENT)
Age: 70
End: 2025-03-08

## 2025-05-08 ENCOUNTER — PATIENT OUTREACH (OUTPATIENT)
Dept: CARE COORDINATION | Facility: CLINIC | Age: 70
End: 2025-05-08
Payer: COMMERCIAL

## 2025-05-11 ENCOUNTER — HEALTH MAINTENANCE LETTER (OUTPATIENT)
Age: 70
End: 2025-05-11

## 2025-06-22 ENCOUNTER — HEALTH MAINTENANCE LETTER (OUTPATIENT)
Age: 70
End: 2025-06-22